# Patient Record
Sex: FEMALE | Race: WHITE | Employment: OTHER | URBAN - METROPOLITAN AREA
[De-identification: names, ages, dates, MRNs, and addresses within clinical notes are randomized per-mention and may not be internally consistent; named-entity substitution may affect disease eponyms.]

---

## 2017-01-01 ENCOUNTER — APPOINTMENT (OUTPATIENT)
Dept: GENERAL RADIOLOGY | Facility: HOSPITAL | Age: 67
DRG: 291 | End: 2017-01-01
Attending: INTERNAL MEDICINE
Payer: MEDICARE

## 2017-01-01 ENCOUNTER — APPOINTMENT (OUTPATIENT)
Dept: ULTRASOUND IMAGING | Facility: HOSPITAL | Age: 67
DRG: 291 | End: 2017-01-01
Attending: HOSPITALIST
Payer: MEDICARE

## 2017-01-01 ENCOUNTER — APPOINTMENT (OUTPATIENT)
Dept: ULTRASOUND IMAGING | Facility: HOSPITAL | Age: 67
DRG: 291 | End: 2017-01-01
Attending: INTERNAL MEDICINE
Payer: MEDICARE

## 2017-01-01 ENCOUNTER — APPOINTMENT (OUTPATIENT)
Dept: GENERAL RADIOLOGY | Facility: HOSPITAL | Age: 67
DRG: 291 | End: 2017-01-01
Attending: HOSPITALIST
Payer: MEDICARE

## 2017-01-01 ENCOUNTER — APPOINTMENT (OUTPATIENT)
Dept: CV DIAGNOSTICS | Facility: HOSPITAL | Age: 67
DRG: 291 | End: 2017-01-01
Attending: HOSPITALIST
Payer: MEDICARE

## 2017-01-01 ENCOUNTER — APPOINTMENT (OUTPATIENT)
Dept: GENERAL RADIOLOGY | Facility: HOSPITAL | Age: 67
DRG: 291 | End: 2017-01-01
Attending: NURSE PRACTITIONER
Payer: MEDICARE

## 2017-01-01 ENCOUNTER — APPOINTMENT (OUTPATIENT)
Dept: GENERAL RADIOLOGY | Facility: HOSPITAL | Age: 67
DRG: 291 | End: 2017-01-01
Attending: STUDENT IN AN ORGANIZED HEALTH CARE EDUCATION/TRAINING PROGRAM
Payer: MEDICARE

## 2017-01-01 ENCOUNTER — APPOINTMENT (OUTPATIENT)
Dept: GENERAL RADIOLOGY | Facility: HOSPITAL | Age: 67
DRG: 291 | End: 2017-01-01
Attending: EMERGENCY MEDICINE
Payer: MEDICARE

## 2017-01-01 ENCOUNTER — APPOINTMENT (OUTPATIENT)
Dept: NUCLEAR MEDICINE | Facility: HOSPITAL | Age: 67
DRG: 291 | End: 2017-01-01
Attending: INTERNAL MEDICINE
Payer: MEDICARE

## 2017-01-01 ENCOUNTER — APPOINTMENT (OUTPATIENT)
Dept: CT IMAGING | Facility: HOSPITAL | Age: 67
DRG: 291 | End: 2017-01-01
Attending: INTERNAL MEDICINE
Payer: MEDICARE

## 2017-01-01 ENCOUNTER — APPOINTMENT (OUTPATIENT)
Dept: INTERVENTIONAL RADIOLOGY/VASCULAR | Facility: HOSPITAL | Age: 67
DRG: 291 | End: 2017-01-01
Attending: INTERNAL MEDICINE
Payer: MEDICARE

## 2017-01-01 ENCOUNTER — HOSPITAL ENCOUNTER (INPATIENT)
Facility: HOSPITAL | Age: 67
LOS: 17 days | DRG: 291 | End: 2017-01-01
Attending: EMERGENCY MEDICINE | Admitting: HOSPITALIST
Payer: MEDICARE

## 2017-01-01 DIAGNOSIS — K92.2 GASTROINTESTINAL HEMORRHAGE, UNSPECIFIED GASTROINTESTINAL HEMORRHAGE TYPE: ICD-10-CM

## 2017-01-01 DIAGNOSIS — D64.9 ANEMIA, UNSPECIFIED TYPE: ICD-10-CM

## 2017-01-01 DIAGNOSIS — J44.1 COPD EXACERBATION (HCC): Primary | ICD-10-CM

## 2017-01-01 LAB
AFP TUMOR MARKER: 6.9 NG/ML (ref ?–8)
ALBUMIN SERPL-MCNC: 1.9 G/DL (ref 3.5–4.8)
ALBUMIN SERPL-MCNC: 2.2 G/DL (ref 3.5–4.8)
ALBUMIN SERPL-MCNC: 2.2 G/DL (ref 3.5–4.8)
ALBUMIN SERPL-MCNC: 2.4 G/DL (ref 3.5–4.8)
ALBUMIN SERPL-MCNC: 2.5 G/DL (ref 3.5–4.8)
ALBUMIN SERPL-MCNC: 2.5 G/DL (ref 3.5–4.8)
ALLENS TEST: POSITIVE
ALP LIVER SERPL-CCNC: 115 U/L (ref 55–142)
ALP LIVER SERPL-CCNC: 77 U/L (ref 55–142)
ALP LIVER SERPL-CCNC: 78 U/L (ref 55–142)
ALP LIVER SERPL-CCNC: 78 U/L (ref 55–142)
ALP LIVER SERPL-CCNC: 79 U/L (ref 55–142)
ALP LIVER SERPL-CCNC: 79 U/L (ref 55–142)
ALP LIVER SERPL-CCNC: 84 U/L (ref 55–142)
ALP LIVER SERPL-CCNC: 85 U/L (ref 55–142)
ALP LIVER SERPL-CCNC: 86 U/L (ref 55–142)
ALT SERPL-CCNC: 12 U/L (ref 14–54)
ALT SERPL-CCNC: 13 U/L (ref 14–54)
ALT SERPL-CCNC: 15 U/L (ref 14–54)
ALT SERPL-CCNC: 16 U/L (ref 14–54)
ALT SERPL-CCNC: 26 U/L (ref 14–54)
ALT SERPL-CCNC: 29 U/L (ref 14–54)
ALT SERPL-CCNC: 7 U/L (ref 14–54)
ALT SERPL-CCNC: 8 U/L (ref 14–54)
ALT SERPL-CCNC: 9 U/L (ref 14–54)
AMMONIA: 156 UMOL/L (ref 11–32)
AMMONIA: 167 UMOL/L (ref 11–32)
AMMONIA: 170 UMOL/L (ref 11–32)
AMMONIA: 46 UMOL/L (ref 11–32)
AMMONIA: 47 UMOL/L (ref 11–32)
AMMONIA: 571 UMOL/L (ref 11–32)
AMMONIA: 60 UMOL/L (ref 11–32)
ANTIBODY SCREEN: NEGATIVE
APTT PPP: 100.6 SECONDS (ref 25–34)
APTT PPP: 102.8 SECONDS (ref 25–34)
APTT PPP: 103.9 SECONDS (ref 25–34)
APTT PPP: 118.1 SECONDS (ref 25–34)
APTT PPP: 130.5 SECONDS (ref 25–34)
APTT PPP: 48 SECONDS (ref 25–34)
APTT PPP: 56.6 SECONDS (ref 25–34)
APTT PPP: 59.4 SECONDS (ref 25–34)
APTT PPP: 66 SECONDS (ref 25–34)
APTT PPP: 73.5 SECONDS (ref 25–34)
APTT PPP: 84.7 SECONDS (ref 25–34)
APTT PPP: 84.8 SECONDS (ref 25–34)
APTT PPP: 84.8 SECONDS (ref 25–34)
APTT PPP: 86.3 SECONDS (ref 25–34)
APTT PPP: 87.7 SECONDS (ref 25–34)
APTT PPP: >300 SECONDS (ref 25–34)
ARTERIAL BLD GAS O2 SATURATION: 89 % (ref 92–100)
ARTERIAL BLD GAS O2 SATURATION: 90 % (ref 92–100)
ARTERIAL BLD GAS O2 SATURATION: 91 % (ref 92–100)
ARTERIAL BLD GAS O2 SATURATION: 95 % (ref 92–100)
ARTERIAL BLD GAS O2 SATURATION: 96 % (ref 92–100)
ARTERIAL BLD GAS O2 SATURATION: 97 % (ref 92–100)
ARTERIAL BLD GAS O2 SATURATION: 97 % (ref 92–100)
ARTERIAL BLOOD GAS BASE EXCESS: -0.5
ARTERIAL BLOOD GAS BASE EXCESS: -1.4
ARTERIAL BLOOD GAS BASE EXCESS: -4.4
ARTERIAL BLOOD GAS BASE EXCESS: -6.7
ARTERIAL BLOOD GAS BASE EXCESS: 0.2
ARTERIAL BLOOD GAS BASE EXCESS: 1.4
ARTERIAL BLOOD GAS BASE EXCESS: 1.5
ARTERIAL BLOOD GAS BASE EXCESS: 4.1
ARTERIAL BLOOD GAS BASE EXCESS: 4.1
ARTERIAL BLOOD GAS BASE EXCESS: 4.9
ARTERIAL BLOOD GAS BASE EXCESS: 6.2
ARTERIAL BLOOD GAS BASE EXCESS: 6.6
ARTERIAL BLOOD GAS BASE EXCESS: 7.4
ARTERIAL BLOOD GAS HCO3: 19.8 MEQ/L (ref 22–26)
ARTERIAL BLOOD GAS HCO3: 23.2 MEQ/L (ref 22–26)
ARTERIAL BLOOD GAS HCO3: 26.2 MEQ/L (ref 22–26)
ARTERIAL BLOOD GAS HCO3: 26.7 MEQ/L (ref 22–26)
ARTERIAL BLOOD GAS HCO3: 27.3 MEQ/L (ref 22–26)
ARTERIAL BLOOD GAS HCO3: 29.2 MEQ/L (ref 22–26)
ARTERIAL BLOOD GAS HCO3: 30.5 MEQ/L (ref 22–26)
ARTERIAL BLOOD GAS HCO3: 30.8 MEQ/L (ref 22–26)
ARTERIAL BLOOD GAS HCO3: 31.2 MEQ/L (ref 22–26)
ARTERIAL BLOOD GAS HCO3: 31.3 MEQ/L (ref 22–26)
ARTERIAL BLOOD GAS HCO3: 32.2 MEQ/L (ref 22–26)
ARTERIAL BLOOD GAS HCO3: 33.2 MEQ/L (ref 22–26)
ARTERIAL BLOOD GAS HCO3: 33.2 MEQ/L (ref 22–26)
ARTERIAL BLOOD GAS PCO2: 41 MM HG (ref 35–45)
ARTERIAL BLOOD GAS PCO2: 45 MM HG (ref 35–45)
ARTERIAL BLOOD GAS PCO2: 52 MM HG (ref 35–45)
ARTERIAL BLOOD GAS PCO2: 53 MM HG (ref 35–45)
ARTERIAL BLOOD GAS PCO2: 54 MM HG (ref 35–45)
ARTERIAL BLOOD GAS PCO2: 55 MM HG (ref 35–45)
ARTERIAL BLOOD GAS PCO2: 59 MM HG (ref 35–45)
ARTERIAL BLOOD GAS PCO2: 59 MM HG (ref 35–45)
ARTERIAL BLOOD GAS PCO2: 60 MM HG (ref 35–45)
ARTERIAL BLOOD GAS PCO2: 61 MM HG (ref 35–45)
ARTERIAL BLOOD GAS PCO2: 63 MM HG (ref 35–45)
ARTERIAL BLOOD GAS PCO2: 74 MM HG (ref 35–45)
ARTERIAL BLOOD GAS PCO2: 82 MM HG (ref 35–45)
ARTERIAL BLOOD GAS PH: 7.19 (ref 7.35–7.45)
ARTERIAL BLOOD GAS PH: 7.21 (ref 7.35–7.45)
ARTERIAL BLOOD GAS PH: 7.25 (ref 7.35–7.45)
ARTERIAL BLOOD GAS PH: 7.26 (ref 7.35–7.45)
ARTERIAL BLOOD GAS PH: 7.27 (ref 7.35–7.45)
ARTERIAL BLOOD GAS PH: 7.29 (ref 7.35–7.45)
ARTERIAL BLOOD GAS PH: 7.34 (ref 7.35–7.45)
ARTERIAL BLOOD GAS PH: 7.34 (ref 7.35–7.45)
ARTERIAL BLOOD GAS PH: 7.36 (ref 7.35–7.45)
ARTERIAL BLOOD GAS PH: 7.38 (ref 7.35–7.45)
ARTERIAL BLOOD GAS PH: 7.39 (ref 7.35–7.45)
ARTERIAL BLOOD GAS PH: 7.39 (ref 7.35–7.45)
ARTERIAL BLOOD GAS PH: 7.41 (ref 7.35–7.45)
ARTERIAL BLOOD GAS PO2: 110 MM HG (ref 80–105)
ARTERIAL BLOOD GAS PO2: 117 MM HG (ref 80–105)
ARTERIAL BLOOD GAS PO2: 121 MM HG (ref 80–105)
ARTERIAL BLOOD GAS PO2: 346 MM HG (ref 80–105)
ARTERIAL BLOOD GAS PO2: 65 MM HG (ref 80–105)
ARTERIAL BLOOD GAS PO2: 66 MM HG (ref 80–105)
ARTERIAL BLOOD GAS PO2: 81 MM HG (ref 80–105)
ARTERIAL BLOOD GAS PO2: 81 MM HG (ref 80–105)
ARTERIAL BLOOD GAS PO2: 85 MM HG (ref 80–105)
ARTERIAL BLOOD GAS PO2: 85 MM HG (ref 80–105)
ARTERIAL BLOOD GAS PO2: 86 MM HG (ref 80–105)
ARTERIAL BLOOD GAS PO2: 96 MM HG (ref 80–105)
ARTERIAL BLOOD GAS PO2: 98 MM HG (ref 80–105)
AST SERPL-CCNC: 39 U/L (ref 15–41)
AST SERPL-CCNC: 41 U/L (ref 15–41)
AST SERPL-CCNC: 44 U/L (ref 15–41)
AST SERPL-CCNC: 47 U/L (ref 15–41)
AST SERPL-CCNC: 47 U/L (ref 15–41)
AST SERPL-CCNC: 49 U/L (ref 15–41)
AST SERPL-CCNC: 50 U/L (ref 15–41)
AST SERPL-CCNC: 51 U/L (ref 15–41)
AST SERPL-CCNC: 52 U/L (ref 15–41)
AST SERPL-CCNC: 53 U/L (ref 15–41)
AST SERPL-CCNC: 61 U/L (ref 15–41)
ATRIAL RATE: 150 BPM
ATRIAL RATE: 70 BPM
BAND %: 1 %
BAND %: 12 %
BAND %: 14 %
BAND %: 15 %
BAND %: 16 %
BAND %: 17 %
BAND %: 24 %
BAND %: 3 %
BAND %: 5 %
BAND %: 6 %
BASOPHIL % MANUAL: 0 %
BASOPHIL % MANUAL: 2 %
BASOPHIL % MANUAL: 2 %
BASOPHIL ABSOLUTE MANUAL: 0 X10(3) UL (ref 0–0.1)
BASOPHIL ABSOLUTE MANUAL: 0.31 X10(3) UL (ref 0–0.1)
BASOPHIL ABSOLUTE MANUAL: 0.38 X10(3) UL (ref 0–0.1)
BASOPHILS # BLD AUTO: 0.04 X10(3) UL (ref 0–0.1)
BASOPHILS # BLD AUTO: 0.05 X10(3) UL (ref 0–0.1)
BASOPHILS # BLD AUTO: 0.06 X10(3) UL (ref 0–0.1)
BASOPHILS # BLD AUTO: 0.07 X10(3) UL (ref 0–0.1)
BASOPHILS # BLD AUTO: 0.09 X10(3) UL (ref 0–0.1)
BASOPHILS # BLD AUTO: 0.1 X10(3) UL (ref 0–0.1)
BASOPHILS # BLD AUTO: 0.11 X10(3) UL (ref 0–0.1)
BASOPHILS NFR BLD AUTO: 0.3 %
BASOPHILS NFR BLD AUTO: 0.4 %
BASOPHILS NFR BLD AUTO: 0.4 %
BASOPHILS NFR BLD AUTO: 0.6 %
BILIRUB DIRECT SERPL-MCNC: 0.2 MG/DL (ref 0.1–0.5)
BILIRUB SERPL-MCNC: 0.4 MG/DL (ref 0.1–2)
BILIRUB SERPL-MCNC: 0.4 MG/DL (ref 0.1–2)
BILIRUB SERPL-MCNC: 0.5 MG/DL (ref 0.1–2)
BILIRUB SERPL-MCNC: 0.6 MG/DL (ref 0.1–2)
BILIRUB SERPL-MCNC: 0.6 MG/DL (ref 0.1–2)
BILIRUB SERPL-MCNC: 0.7 MG/DL (ref 0.1–2)
BILIRUB SERPL-MCNC: 0.7 MG/DL (ref 0.1–2)
BILIRUB SERPL-MCNC: 1.1 MG/DL (ref 0.1–2)
BILIRUB UR QL STRIP.AUTO: NEGATIVE
BLOOD TYPE BARCODE: 600
BLOOD TYPE BARCODE: 6200
BLOOD TYPE BARCODE: 6200
BUN BLD-MCNC: 10 MG/DL (ref 8–20)
BUN BLD-MCNC: 11 MG/DL (ref 8–20)
BUN BLD-MCNC: 12 MG/DL (ref 8–20)
BUN BLD-MCNC: 13 MG/DL (ref 8–20)
BUN BLD-MCNC: 13 MG/DL (ref 8–20)
BUN BLD-MCNC: 14 MG/DL (ref 8–20)
BUN BLD-MCNC: 14 MG/DL (ref 8–20)
BUN BLD-MCNC: 15 MG/DL (ref 8–20)
BUN BLD-MCNC: 15 MG/DL (ref 8–20)
BUN BLD-MCNC: 16 MG/DL (ref 8–20)
BUN BLD-MCNC: 17 MG/DL (ref 8–20)
BUN BLD-MCNC: 17 MG/DL (ref 8–20)
BUN BLD-MCNC: 18 MG/DL (ref 8–20)
BUN BLD-MCNC: 19 MG/DL (ref 8–20)
BUN BLD-MCNC: 19 MG/DL (ref 8–20)
BUN BLD-MCNC: 21 MG/DL (ref 8–20)
BUN BLD-MCNC: 22 MG/DL (ref 8–20)
BUN BLD-MCNC: 25 MG/DL (ref 8–20)
BUN BLD-MCNC: 25 MG/DL (ref 8–20)
BUN BLD-MCNC: 28 MG/DL (ref 8–20)
BUN BLD-MCNC: 30 MG/DL (ref 8–20)
BUN BLD-MCNC: 32 MG/DL (ref 8–20)
BUN BLD-MCNC: 33 MG/DL (ref 8–20)
BUN BLD-MCNC: 34 MG/DL (ref 8–20)
BUN BLD-MCNC: 35 MG/DL (ref 8–20)
BUN BLD-MCNC: 37 MG/DL (ref 8–20)
BUN BLD-MCNC: 41 MG/DL (ref 8–20)
BUN BLD-MCNC: 8 MG/DL (ref 8–20)
C-REACTIVE PROTEIN: 1.77 MG/DL (ref ?–1)
CALCIUM BLD-MCNC: 7.4 MG/DL (ref 8.3–10.3)
CALCIUM BLD-MCNC: 8.2 MG/DL (ref 8.3–10.3)
CALCIUM BLD-MCNC: 8.3 MG/DL (ref 8.3–10.3)
CALCIUM BLD-MCNC: 8.6 MG/DL (ref 8.3–10.3)
CALCIUM BLD-MCNC: 8.7 MG/DL (ref 8.3–10.3)
CALCIUM BLD-MCNC: 8.8 MG/DL (ref 8.3–10.3)
CALCIUM BLD-MCNC: 8.8 MG/DL (ref 8.3–10.3)
CALCIUM BLD-MCNC: 8.9 MG/DL (ref 8.3–10.3)
CALCIUM BLD-MCNC: 9 MG/DL (ref 8.3–10.3)
CALCIUM BLD-MCNC: 9.1 MG/DL (ref 8.3–10.3)
CALCIUM BLD-MCNC: 9.2 MG/DL (ref 8.3–10.3)
CALCIUM BLD-MCNC: 9.3 MG/DL (ref 8.3–10.3)
CALCIUM BLD-MCNC: 9.4 MG/DL (ref 8.3–10.3)
CALCIUM BLD-MCNC: 9.6 MG/DL (ref 8.3–10.3)
CALCIUM BLD-MCNC: 9.7 MG/DL (ref 8.3–10.3)
CALCIUM BLD-MCNC: 9.8 MG/DL (ref 8.3–10.3)
CALCULATED O2 SATURATION: 100 % (ref 92–100)
CALCULATED O2 SATURATION: 89 % (ref 92–100)
CALCULATED O2 SATURATION: 90 % (ref 92–100)
CALCULATED O2 SATURATION: 92 % (ref 92–100)
CALCULATED O2 SATURATION: 95 % (ref 92–100)
CALCULATED O2 SATURATION: 96 % (ref 92–100)
CALCULATED O2 SATURATION: 96 % (ref 92–100)
CALCULATED O2 SATURATION: 97 % (ref 92–100)
CALCULATED O2 SATURATION: 98 % (ref 92–100)
CALCULATED O2 SATURATION: 99 % (ref 92–100)
CALCULATED O2 SATURATION: 99 % (ref 92–100)
CARBOXYHEMOGLOBIN: 1.3 % SAT (ref 0–3)
CARBOXYHEMOGLOBIN: 1.4 % SAT (ref 0–3)
CARBOXYHEMOGLOBIN: 1.5 % SAT (ref 0–3)
CARBOXYHEMOGLOBIN: 1.6 % SAT (ref 0–3)
CARBOXYHEMOGLOBIN: 1.7 % SAT (ref 0–3)
CHLORIDE: 100 MMOL/L (ref 101–111)
CHLORIDE: 101 MMOL/L (ref 101–111)
CHLORIDE: 102 MMOL/L (ref 101–111)
CHLORIDE: 93 MMOL/L (ref 101–111)
CHLORIDE: 93 MMOL/L (ref 101–111)
CHLORIDE: 94 MMOL/L (ref 101–111)
CHLORIDE: 96 MMOL/L (ref 101–111)
CHLORIDE: 97 MMOL/L (ref 101–111)
CHLORIDE: 98 MMOL/L (ref 101–111)
CHLORIDE: 99 MMOL/L (ref 101–111)
CLARITY UR REFRACT.AUTO: CLEAR
CO2: 22 MMOL/L (ref 22–32)
CO2: 24 MMOL/L (ref 22–32)
CO2: 27 MMOL/L (ref 22–32)
CO2: 29 MMOL/L (ref 22–32)
CO2: 30 MMOL/L (ref 22–32)
CO2: 31 MMOL/L (ref 22–32)
CO2: 32 MMOL/L (ref 22–32)
CO2: 33 MMOL/L (ref 22–32)
CO2: 34 MMOL/L (ref 22–32)
CO2: 34 MMOL/L (ref 22–32)
CO2: 35 MMOL/L (ref 22–32)
CO2: 35 MMOL/L (ref 22–32)
CO2: 36 MMOL/L (ref 22–32)
CO2: 36 MMOL/L (ref 22–32)
COLOR UR AUTO: YELLOW
COLOR UR AUTO: YELLOW
CORTISOL: 19.2 UG/DL
CREAT BLD-MCNC: 0.68 MG/DL (ref 0.55–1.02)
CREAT BLD-MCNC: 1.01 MG/DL (ref 0.55–1.02)
CREAT BLD-MCNC: 1.15 MG/DL (ref 0.55–1.02)
CREAT BLD-MCNC: 1.19 MG/DL (ref 0.55–1.02)
CREAT BLD-MCNC: 1.21 MG/DL (ref 0.55–1.02)
CREAT BLD-MCNC: 1.22 MG/DL (ref 0.55–1.02)
CREAT BLD-MCNC: 1.22 MG/DL (ref 0.55–1.02)
CREAT BLD-MCNC: 1.23 MG/DL (ref 0.55–1.02)
CREAT BLD-MCNC: 1.25 MG/DL (ref 0.55–1.02)
CREAT BLD-MCNC: 1.26 MG/DL (ref 0.55–1.02)
CREAT BLD-MCNC: 1.28 MG/DL (ref 0.55–1.02)
CREAT BLD-MCNC: 1.28 MG/DL (ref 0.55–1.02)
CREAT BLD-MCNC: 1.36 MG/DL (ref 0.55–1.02)
CREAT BLD-MCNC: 1.37 MG/DL (ref 0.55–1.02)
CREAT BLD-MCNC: 1.37 MG/DL (ref 0.55–1.02)
CREAT BLD-MCNC: 1.38 MG/DL (ref 0.55–1.02)
CREAT BLD-MCNC: 1.39 MG/DL (ref 0.55–1.02)
CREAT BLD-MCNC: 1.45 MG/DL (ref 0.55–1.02)
CREAT BLD-MCNC: 1.45 MG/DL (ref 0.55–1.02)
CREAT BLD-MCNC: 1.47 MG/DL (ref 0.55–1.02)
CREAT BLD-MCNC: 1.48 MG/DL (ref 0.55–1.02)
CREAT BLD-MCNC: 1.5 MG/DL (ref 0.55–1.02)
CREAT BLD-MCNC: 1.5 MG/DL (ref 0.55–1.02)
CREAT BLD-MCNC: 1.52 MG/DL (ref 0.55–1.02)
CREAT BLD-MCNC: 1.7 MG/DL (ref 0.55–1.02)
CREAT BLD-MCNC: 1.83 MG/DL (ref 0.55–1.02)
CREAT BLD-MCNC: 1.89 MG/DL (ref 0.55–1.02)
CREAT BLD-MCNC: 1.93 MG/DL (ref 0.55–1.02)
CREAT BLD-MCNC: 2 MG/DL (ref 0.55–1.02)
CREAT BLD-MCNC: 2.1 MG/DL (ref 0.55–1.02)
CREAT BLD-MCNC: 2.12 MG/DL (ref 0.55–1.02)
CREAT BLD-MCNC: 2.12 MG/DL (ref 0.55–1.02)
CREAT BLD-MCNC: 2.91 MG/DL (ref 0.55–1.02)
CREAT BLD-MCNC: 2.94 MG/DL (ref 0.55–1.02)
CREAT BLD-MCNC: 2.95 MG/DL (ref 0.55–1.02)
CREAT BLD-MCNC: 3.01 MG/DL (ref 0.55–1.02)
CREAT BLD-MCNC: 3.08 MG/DL (ref 0.55–1.02)
CREAT BLD-MCNC: 3.43 MG/DL (ref 0.55–1.02)
CREAT UR-SCNC: 189 MG/DL
D-DIMER: 1.74 UG/ML FEU (ref 0–0.49)
DEPRECATED HBV CORE AB SER IA-ACNC: 18.9 NG/ML (ref 10–291)
EOSINOPHIL # BLD AUTO: 0.42 X10(3) UL (ref 0–0.3)
EOSINOPHIL # BLD AUTO: 0.64 X10(3) UL (ref 0–0.3)
EOSINOPHIL # BLD AUTO: 0.7 X10(3) UL (ref 0–0.3)
EOSINOPHIL # BLD AUTO: 0.78 X10(3) UL (ref 0–0.3)
EOSINOPHIL # BLD AUTO: 0.93 X10(3) UL (ref 0–0.3)
EOSINOPHIL # BLD AUTO: 0.94 X10(3) UL (ref 0–0.3)
EOSINOPHIL # BLD AUTO: 1.03 X10(3) UL (ref 0–0.3)
EOSINOPHIL % MANUAL: 0 %
EOSINOPHIL % MANUAL: 1 %
EOSINOPHIL % MANUAL: 2 %
EOSINOPHIL % MANUAL: 4 %
EOSINOPHIL % MANUAL: 4 %
EOSINOPHIL % MANUAL: 5 %
EOSINOPHIL % MANUAL: 5 %
EOSINOPHIL % MANUAL: 9 %
EOSINOPHIL ABSOLUTE MANUAL: 0 X10(3) UL (ref 0–0.3)
EOSINOPHIL ABSOLUTE MANUAL: 0.33 X10(3) UL (ref 0–0.3)
EOSINOPHIL ABSOLUTE MANUAL: 0.39 X10(3) UL (ref 0–0.3)
EOSINOPHIL ABSOLUTE MANUAL: 0.63 X10(3) UL (ref 0–0.3)
EOSINOPHIL ABSOLUTE MANUAL: 0.68 X10(3) UL (ref 0–0.3)
EOSINOPHIL ABSOLUTE MANUAL: 0.9 X10(3) UL (ref 0–0.3)
EOSINOPHIL ABSOLUTE MANUAL: 0.95 X10(3) UL (ref 0–0.3)
EOSINOPHIL ABSOLUTE MANUAL: 1.72 X10(3) UL (ref 0–0.3)
EOSINOPHIL NFR BLD AUTO: 2.5 %
EOSINOPHIL NFR BLD AUTO: 5.1 %
EOSINOPHIL NFR BLD AUTO: 5.3 %
EOSINOPHIL NFR BLD AUTO: 5.4 %
EOSINOPHIL NFR BLD AUTO: 5.5 %
EOSINOPHIL NFR BLD AUTO: 5.5 %
EOSINOPHIL NFR BLD AUTO: 5.7 %
EOSINOPHILS,URINE: POSITIVE
ERYTHROCYTE [DISTWIDTH] IN BLOOD BY AUTOMATED COUNT: 15.9 % (ref 11.5–16)
ERYTHROCYTE [DISTWIDTH] IN BLOOD BY AUTOMATED COUNT: 15.9 % (ref 11.5–16)
ERYTHROCYTE [DISTWIDTH] IN BLOOD BY AUTOMATED COUNT: 16 % (ref 11.5–16)
ERYTHROCYTE [DISTWIDTH] IN BLOOD BY AUTOMATED COUNT: 16.1 % (ref 11.5–16)
ERYTHROCYTE [DISTWIDTH] IN BLOOD BY AUTOMATED COUNT: 16.2 % (ref 11.5–16)
ERYTHROCYTE [DISTWIDTH] IN BLOOD BY AUTOMATED COUNT: 16.2 % (ref 11.5–16)
ERYTHROCYTE [DISTWIDTH] IN BLOOD BY AUTOMATED COUNT: 16.3 % (ref 11.5–16)
ERYTHROCYTE [DISTWIDTH] IN BLOOD BY AUTOMATED COUNT: 16.6 % (ref 11.5–16)
ERYTHROCYTE [DISTWIDTH] IN BLOOD BY AUTOMATED COUNT: 16.9 % (ref 11.5–16)
ERYTHROCYTE [DISTWIDTH] IN BLOOD BY AUTOMATED COUNT: 17.2 % (ref 11.5–16)
ERYTHROCYTE [DISTWIDTH] IN BLOOD BY AUTOMATED COUNT: 17.2 % (ref 11.5–16)
ERYTHROCYTE [DISTWIDTH] IN BLOOD BY AUTOMATED COUNT: 17.6 % (ref 11.5–16)
ERYTHROCYTE [DISTWIDTH] IN BLOOD BY AUTOMATED COUNT: 17.8 % (ref 11.5–16)
ERYTHROCYTE [DISTWIDTH] IN BLOOD BY AUTOMATED COUNT: 18.8 % (ref 11.5–16)
ERYTHROCYTE [DISTWIDTH] IN BLOOD BY AUTOMATED COUNT: 18.9 % (ref 11.5–16)
ERYTHROCYTE [DISTWIDTH] IN BLOOD BY AUTOMATED COUNT: 19 % (ref 11.5–16)
ERYTHROCYTE [DISTWIDTH] IN BLOOD BY AUTOMATED COUNT: 19 % (ref 11.5–16)
ERYTHROCYTE [DISTWIDTH] IN BLOOD BY AUTOMATED COUNT: 19.4 % (ref 11.5–16)
FIO2: 100 %
FIO2: 40 %
FOLATE (FOLIC ACID), SERUM: 31.6 NG/ML (ref 8.7–24)
FREE T4: 1.4 NG/DL (ref 0.9–1.8)
FREE T4: 1.4 NG/DL (ref 0.9–1.8)
FREE T4: 1.5 NG/DL (ref 0.9–1.8)
GLUCOSE BLD-MCNC: 100 MG/DL (ref 70–99)
GLUCOSE BLD-MCNC: 101 MG/DL (ref 70–99)
GLUCOSE BLD-MCNC: 101 MG/DL (ref 70–99)
GLUCOSE BLD-MCNC: 102 MG/DL (ref 70–99)
GLUCOSE BLD-MCNC: 103 MG/DL (ref 70–99)
GLUCOSE BLD-MCNC: 104 MG/DL (ref 70–99)
GLUCOSE BLD-MCNC: 106 MG/DL (ref 70–99)
GLUCOSE BLD-MCNC: 107 MG/DL (ref 70–99)
GLUCOSE BLD-MCNC: 110 MG/DL (ref 70–99)
GLUCOSE BLD-MCNC: 110 MG/DL (ref 70–99)
GLUCOSE BLD-MCNC: 112 MG/DL (ref 70–99)
GLUCOSE BLD-MCNC: 112 MG/DL (ref 70–99)
GLUCOSE BLD-MCNC: 120 MG/DL (ref 70–99)
GLUCOSE BLD-MCNC: 120 MG/DL (ref 70–99)
GLUCOSE BLD-MCNC: 121 MG/DL (ref 70–99)
GLUCOSE BLD-MCNC: 122 MG/DL (ref 70–99)
GLUCOSE BLD-MCNC: 77 MG/DL (ref 70–99)
GLUCOSE BLD-MCNC: 78 MG/DL (ref 70–99)
GLUCOSE BLD-MCNC: 79 MG/DL (ref 70–99)
GLUCOSE BLD-MCNC: 80 MG/DL (ref 70–99)
GLUCOSE BLD-MCNC: 81 MG/DL (ref 70–99)
GLUCOSE BLD-MCNC: 82 MG/DL (ref 70–99)
GLUCOSE BLD-MCNC: 84 MG/DL (ref 70–99)
GLUCOSE BLD-MCNC: 85 MG/DL (ref 70–99)
GLUCOSE BLD-MCNC: 85 MG/DL (ref 70–99)
GLUCOSE BLD-MCNC: 87 MG/DL (ref 70–99)
GLUCOSE BLD-MCNC: 87 MG/DL (ref 70–99)
GLUCOSE BLD-MCNC: 92 MG/DL (ref 70–99)
GLUCOSE BLD-MCNC: 94 MG/DL (ref 70–99)
GLUCOSE BLD-MCNC: 95 MG/DL (ref 70–99)
GLUCOSE BLD-MCNC: 97 MG/DL (ref 70–99)
GLUCOSE BLD-MCNC: 98 MG/DL (ref 70–99)
GLUCOSE BLD-MCNC: 98 MG/DL (ref 70–99)
GLUCOSE UR STRIP.AUTO-MCNC: NEGATIVE MG/DL
HAV AB SERPL IA-ACNC: 1202 PG/ML (ref 193–986)
HAV IGM SER QL: 1.2 MG/DL (ref 1.7–3)
HAV IGM SER QL: 1.3 MG/DL (ref 1.7–3)
HAV IGM SER QL: 1.3 MG/DL (ref 1.7–3)
HAV IGM SER QL: 1.5 MG/DL (ref 1.7–3)
HAV IGM SER QL: 1.6 MG/DL (ref 1.7–3)
HAV IGM SER QL: 1.7 MG/DL (ref 1.7–3)
HAV IGM SER QL: 1.9 MG/DL (ref 1.7–3)
HAV IGM SER QL: 1.9 MG/DL (ref 1.7–3)
HAV IGM SER QL: 2.1 MG/DL (ref 1.7–3)
HAV IGM SER QL: 2.2 MG/DL (ref 1.7–3)
HAV IGM SER QL: 2.2 MG/DL (ref 1.7–3)
HAV IGM SER QL: 2.3 MG/DL (ref 1.7–3)
HAV IGM SER QL: 2.4 MG/DL (ref 1.7–3)
HAV IGM SER QL: 2.5 MG/DL (ref 1.7–3)
HAV IGM SER QL: 2.5 MG/DL (ref 1.7–3)
HCT VFR BLD AUTO: 26.7 % (ref 34–50)
HCT VFR BLD AUTO: 27.4 % (ref 34–50)
HCT VFR BLD AUTO: 27.7 % (ref 34–50)
HCT VFR BLD AUTO: 27.7 % (ref 34–50)
HCT VFR BLD AUTO: 27.8 % (ref 34–50)
HCT VFR BLD AUTO: 28.2 % (ref 34–50)
HCT VFR BLD AUTO: 28.3 % (ref 34–50)
HCT VFR BLD AUTO: 29.1 % (ref 34–50)
HCT VFR BLD AUTO: 29.1 % (ref 34–50)
HCT VFR BLD AUTO: 29.5 % (ref 34–50)
HCT VFR BLD AUTO: 29.7 % (ref 34–50)
HCT VFR BLD AUTO: 30.1 % (ref 34–50)
HCT VFR BLD AUTO: 30.6 % (ref 34–50)
HCT VFR BLD AUTO: 30.7 % (ref 34–50)
HCT VFR BLD AUTO: 32 % (ref 34–50)
HCT VFR BLD AUTO: 33 % (ref 34–50)
HGB BLD-MCNC: 7.7 G/DL (ref 12–16)
HGB BLD-MCNC: 8.1 G/DL (ref 12–16)
HGB BLD-MCNC: 8.2 G/DL (ref 12–16)
HGB BLD-MCNC: 8.4 G/DL (ref 12–16)
HGB BLD-MCNC: 8.4 G/DL (ref 12–16)
HGB BLD-MCNC: 8.5 G/DL (ref 12–16)
HGB BLD-MCNC: 8.5 G/DL (ref 12–16)
HGB BLD-MCNC: 8.6 G/DL (ref 12–16)
HGB BLD-MCNC: 8.7 G/DL (ref 12–16)
HGB BLD-MCNC: 8.9 G/DL (ref 12–16)
HGB BLD-MCNC: 8.9 G/DL (ref 12–16)
HGB BLD-MCNC: 9.1 G/DL (ref 12–16)
HGB BLD-MCNC: 9.1 G/DL (ref 12–16)
HGB BLD-MCNC: 9.2 G/DL (ref 12–16)
HGB BLD-MCNC: 9.3 G/DL (ref 12–16)
HGB BLD-MCNC: 9.3 G/DL (ref 12–16)
HGB BLD-MCNC: 9.5 G/DL (ref 12–16)
HGB BLD-MCNC: 9.5 G/DL (ref 12–16)
HGB BLD-MCNC: 9.7 G/DL (ref 12–16)
HYALINE CASTS #/AREA URNS AUTO: PRESENT /LPF
IMMATURE GRANULOCYTE COUNT: 0.13 X10(3) UL (ref 0–1)
IMMATURE GRANULOCYTE COUNT: 0.15 X10(3) UL (ref 0–1)
IMMATURE GRANULOCYTE COUNT: 0.17 X10(3) UL (ref 0–1)
IMMATURE GRANULOCYTE COUNT: 0.21 X10(3) UL (ref 0–1)
IMMATURE GRANULOCYTE COUNT: 0.24 X10(3) UL (ref 0–1)
IMMATURE GRANULOCYTE COUNT: 0.37 X10(3) UL (ref 0–1)
IMMATURE GRANULOCYTE COUNT: 0.42 X10(3) UL (ref 0–1)
IMMATURE GRANULOCYTE RATIO %: 1 %
IMMATURE GRANULOCYTE RATIO %: 1.1 %
IMMATURE GRANULOCYTE RATIO %: 1.1 %
IMMATURE GRANULOCYTE RATIO %: 1.5 %
IMMATURE GRANULOCYTE RATIO %: 1.5 %
IMMATURE GRANULOCYTE RATIO %: 2.1 %
IMMATURE GRANULOCYTE RATIO %: 2.2 %
INR BLD: 1.38 (ref 0.89–1.11)
INR BLD: 1.42 (ref 0.89–1.11)
INR BLD: 1.63 (ref 0.89–1.11)
IONIZED CALCIUM: 1.17 MMOL/L (ref 1.12–1.32)
IONIZED CALCIUM: 1.18 MMOL/L (ref 1.12–1.32)
IONIZED CALCIUM: 1.22 MMOL/L (ref 1.12–1.32)
IRON SATURATION: 5 % (ref 13–45)
IRON: 21 UG/DL (ref 28–170)
KETONES UR STRIP.AUTO-MCNC: NEGATIVE MG/DL
L/M: 4 L/MIN
L/M: 4 L/MIN
L/M: 5 L/MIN
L/M: 6 L/MIN
L/M: 6 L/MIN
L/M: 7 L/MIN
LACTIC ACID ARTERIAL: 1.4 MMOL/L (ref 0.5–2)
LACTIC ACID ARTERIAL: 1.5 MMOL/L (ref 0.5–2)
LACTIC ACID ARTERIAL: 1.5 MMOL/L (ref 0.5–2)
LACTIC ACID: 1.3 MMOL/L (ref 0.5–2)
LDH: 212 U/L (ref 84–246)
LYMPHOCYTE % MANUAL: 13 %
LYMPHOCYTE % MANUAL: 14 %
LYMPHOCYTE % MANUAL: 14 %
LYMPHOCYTE % MANUAL: 17 %
LYMPHOCYTE % MANUAL: 20 %
LYMPHOCYTE % MANUAL: 3 %
LYMPHOCYTE % MANUAL: 4 %
LYMPHOCYTE % MANUAL: 5 %
LYMPHOCYTE % MANUAL: 6 %
LYMPHOCYTE % MANUAL: 8 %
LYMPHOCYTE ABSOLUTE MANUAL: 1.32 X10(3) UL (ref 0.9–4)
LYMPHOCYTE ABSOLUTE MANUAL: 1.34 X10(3) UL (ref 0.9–4)
LYMPHOCYTE ABSOLUTE MANUAL: 1.44 X10(3) UL (ref 0.9–4)
LYMPHOCYTE ABSOLUTE MANUAL: 1.69 X10(3) UL (ref 0.9–4)
LYMPHOCYTE ABSOLUTE MANUAL: 2 X10(3) UL (ref 0.9–4)
LYMPHOCYTE ABSOLUTE MANUAL: 2.04 X10(3) UL (ref 0.9–4)
LYMPHOCYTE ABSOLUTE MANUAL: 2.66 X10(3) UL (ref 0.9–4)
LYMPHOCYTE ABSOLUTE MANUAL: 2.67 X10(3) UL (ref 0.9–4)
LYMPHOCYTE ABSOLUTE MANUAL: 3.3 X10(3) UL (ref 0.9–4)
LYMPHOCYTE ABSOLUTE MANUAL: 3.42 X10(3) UL (ref 0.9–4)
LYMPHOCYTES # BLD AUTO: 1.11 X10(3) UL (ref 0.9–4)
LYMPHOCYTES # BLD AUTO: 1.34 X10(3) UL (ref 0.9–4)
LYMPHOCYTES # BLD AUTO: 1.36 X10(3) UL (ref 0.9–4)
LYMPHOCYTES # BLD AUTO: 1.45 X10(3) UL (ref 0.9–4)
LYMPHOCYTES # BLD AUTO: 1.58 X10(3) UL (ref 0.9–4)
LYMPHOCYTES # BLD AUTO: 2.13 X10(3) UL (ref 0.9–4)
LYMPHOCYTES # BLD AUTO: 2.14 X10(3) UL (ref 0.9–4)
LYMPHOCYTES NFR BLD AUTO: 11.5 %
LYMPHOCYTES NFR BLD AUTO: 13 %
LYMPHOCYTES NFR BLD AUTO: 17.6 %
LYMPHOCYTES NFR BLD AUTO: 6.8 %
LYMPHOCYTES NFR BLD AUTO: 7.6 %
LYMPHOCYTES NFR BLD AUTO: 7.9 %
LYMPHOCYTES NFR BLD AUTO: 9.5 %
M PROTEIN 24H UR ELPH-MRATE: 120 MG/24 HR (ref ?–100)
M PROTEIN MFR SERPL ELPH: 5.8 G/DL (ref 6.1–8.3)
M PROTEIN MFR SERPL ELPH: 6.3 G/DL (ref 6.1–8.3)
M PROTEIN MFR SERPL ELPH: 6.4 G/DL (ref 6.1–8.3)
M PROTEIN MFR SERPL ELPH: 6.4 G/DL (ref 6.1–8.3)
M PROTEIN MFR SERPL ELPH: 6.5 G/DL (ref 6.1–8.3)
M PROTEIN MFR SERPL ELPH: 6.6 G/DL (ref 6.1–8.3)
M PROTEIN MFR SERPL ELPH: 6.7 G/DL (ref 6.1–8.3)
M PROTEIN MFR SERPL ELPH: 6.9 G/DL (ref 6.1–8.3)
M PROTEIN MFR SERPL ELPH: 6.9 G/DL (ref 6.1–8.3)
M PROTEIN MFR SERPL ELPH: 7 G/DL (ref 6.1–8.3)
M PROTEIN MFR SERPL ELPH: 7 G/DL (ref 6.1–8.3)
MCH RBC QN AUTO: 27.3 PG (ref 27–33.2)
MCH RBC QN AUTO: 27.3 PG (ref 27–33.2)
MCH RBC QN AUTO: 27.5 PG (ref 27–33.2)
MCH RBC QN AUTO: 27.5 PG (ref 27–33.2)
MCH RBC QN AUTO: 27.7 PG (ref 27–33.2)
MCH RBC QN AUTO: 27.7 PG (ref 27–33.2)
MCH RBC QN AUTO: 27.8 PG (ref 27–33.2)
MCH RBC QN AUTO: 28 PG (ref 27–33.2)
MCH RBC QN AUTO: 28 PG (ref 27–33.2)
MCH RBC QN AUTO: 28.1 PG (ref 27–33.2)
MCH RBC QN AUTO: 28.1 PG (ref 27–33.2)
MCH RBC QN AUTO: 28.2 PG (ref 27–33.2)
MCH RBC QN AUTO: 28.2 PG (ref 27–33.2)
MCH RBC QN AUTO: 28.6 PG (ref 27–33.2)
MCH RBC QN AUTO: 28.8 PG (ref 27–33.2)
MCH RBC QN AUTO: 28.9 PG (ref 27–33.2)
MCH RBC QN AUTO: 28.9 PG (ref 27–33.2)
MCH RBC QN AUTO: 29 PG (ref 27–33.2)
MCHC RBC AUTO-ENTMCNC: 28.6 G/DL (ref 31–37)
MCHC RBC AUTO-ENTMCNC: 28.8 G/DL (ref 31–37)
MCHC RBC AUTO-ENTMCNC: 29.4 G/DL (ref 31–37)
MCHC RBC AUTO-ENTMCNC: 29.6 G/DL (ref 31–37)
MCHC RBC AUTO-ENTMCNC: 29.7 G/DL (ref 31–37)
MCHC RBC AUTO-ENTMCNC: 29.7 G/DL (ref 31–37)
MCHC RBC AUTO-ENTMCNC: 29.9 G/DL (ref 31–37)
MCHC RBC AUTO-ENTMCNC: 29.9 G/DL (ref 31–37)
MCHC RBC AUTO-ENTMCNC: 30 G/DL (ref 31–37)
MCHC RBC AUTO-ENTMCNC: 30 G/DL (ref 31–37)
MCHC RBC AUTO-ENTMCNC: 30.1 G/DL (ref 31–37)
MCHC RBC AUTO-ENTMCNC: 30.2 G/DL (ref 31–37)
MCHC RBC AUTO-ENTMCNC: 30.3 G/DL (ref 31–37)
MCHC RBC AUTO-ENTMCNC: 30.4 G/DL (ref 31–37)
MCHC RBC AUTO-ENTMCNC: 30.4 G/DL (ref 31–37)
MCHC RBC AUTO-ENTMCNC: 30.7 G/DL (ref 31–37)
MCV RBC AUTO: 91.6 FL (ref 81–100)
MCV RBC AUTO: 91.9 FL (ref 81–100)
MCV RBC AUTO: 92 FL (ref 81–100)
MCV RBC AUTO: 92.4 FL (ref 81–100)
MCV RBC AUTO: 92.8 FL (ref 81–100)
MCV RBC AUTO: 92.9 FL (ref 81–100)
MCV RBC AUTO: 93 FL (ref 81–100)
MCV RBC AUTO: 93.4 FL (ref 81–100)
MCV RBC AUTO: 94.2 FL (ref 81–100)
MCV RBC AUTO: 94.2 FL (ref 81–100)
MCV RBC AUTO: 94.5 FL (ref 81–100)
MCV RBC AUTO: 95.1 FL (ref 81–100)
MCV RBC AUTO: 95.2 FL (ref 81–100)
MCV RBC AUTO: 95.3 FL (ref 81–100)
MCV RBC AUTO: 95.5 FL (ref 81–100)
MCV RBC AUTO: 95.6 FL (ref 81–100)
MCV RBC AUTO: 95.9 FL (ref 81–100)
MCV RBC AUTO: 96 FL (ref 81–100)
METAMYELOCYTE %: 1 %
METAMYELOCYTE %: 1 %
METAMYELOCYTE %: 3 %
METAMYELOCYTE %: 3 %
METAMYELOCYTE ABSOLUTE MANUAL: 0.17 X10(3) UL (ref ?–0.01)
METAMYELOCYTE ABSOLUTE MANUAL: 0.19 X10(3) UL (ref ?–0.01)
METAMYELOCYTE ABSOLUTE MANUAL: 0.47 X10(3) UL (ref ?–0.01)
METAMYELOCYTE ABSOLUTE MANUAL: 1.01 X10(3) UL (ref ?–0.01)
METHEMOGLOBIN: 0.6 % SAT (ref 0.4–1.5)
METHEMOGLOBIN: 0.7 % SAT (ref 0.4–1.5)
METHEMOGLOBIN: 0.8 % SAT (ref 0.4–1.5)
METHEMOGLOBIN: 0.9 % SAT (ref 0.4–1.5)
MONOCYTE % MANUAL: 10 %
MONOCYTE % MANUAL: 3 %
MONOCYTE % MANUAL: 4 %
MONOCYTE % MANUAL: 4 %
MONOCYTE % MANUAL: 5 %
MONOCYTE % MANUAL: 6 %
MONOCYTE % MANUAL: 7 %
MONOCYTE % MANUAL: 7 %
MONOCYTE ABSOLUTE MANUAL: 0.51 X10(3) UL (ref 0.1–0.6)
MONOCYTE ABSOLUTE MANUAL: 0.63 X10(3) UL (ref 0.1–0.6)
MONOCYTE ABSOLUTE MANUAL: 0.9 X10(3) UL (ref 0.1–0.6)
MONOCYTE ABSOLUTE MANUAL: 0.97 X10(3) UL (ref 0.1–0.6)
MONOCYTE ABSOLUTE MANUAL: 1.33 X10(3) UL (ref 0.1–0.6)
MONOCYTE ABSOLUTE MANUAL: 1.34 X10(3) UL (ref 0.1–0.6)
MONOCYTE ABSOLUTE MANUAL: 1.67 X10(3) UL (ref 0.1–0.6)
MONOCYTE ABSOLUTE MANUAL: 1.91 X10(3) UL (ref 0.1–0.6)
MONOCYTE ABSOLUTE MANUAL: 2.02 X10(3) UL (ref 0.1–0.6)
MONOCYTE ABSOLUTE MANUAL: 3.07 X10(3) UL (ref 0.1–0.6)
MONOCYTES # BLD AUTO: 1.09 X10(3) UL (ref 0.1–0.6)
MONOCYTES # BLD AUTO: 1.23 X10(3) UL (ref 0.1–0.6)
MONOCYTES # BLD AUTO: 1.26 X10(3) UL (ref 0.1–0.6)
MONOCYTES # BLD AUTO: 1.53 X10(3) UL (ref 0.1–0.6)
MONOCYTES # BLD AUTO: 1.59 X10(3) UL (ref 0.1–0.6)
MONOCYTES # BLD AUTO: 1.62 X10(3) UL (ref 0.1–0.6)
MONOCYTES # BLD AUTO: 1.67 X10(3) UL (ref 0.1–0.6)
MONOCYTES NFR BLD AUTO: 10.1 %
MONOCYTES NFR BLD AUTO: 8.3 %
MONOCYTES NFR BLD AUTO: 8.9 %
MONOCYTES NFR BLD AUTO: 9 %
MONOCYTES NFR BLD AUTO: 9 %
MONOCYTES NFR BLD AUTO: 9.4 %
MONOCYTES NFR BLD AUTO: 9.4 %
MORPHOLOGY: NORMAL
MYELOCYTE %: 1 %
MYELOCYTE %: 2 %
MYELOCYTE ABSOLUTE MANUAL: 0.17 X10(3) UL (ref ?–0.01)
MYELOCYTE ABSOLUTE MANUAL: 0.18 X10(3) UL (ref ?–0.01)
MYELOCYTE ABSOLUTE MANUAL: 0.31 X10(3) UL (ref ?–0.01)
MYELOCYTE ABSOLUTE MANUAL: 0.44 X10(3) UL (ref ?–0.01)
NEUTROPHIL ABS PRELIM: 10.01 X10 (3) UL (ref 1.3–6.7)
NEUTROPHIL ABS PRELIM: 10.52 X10 (3) UL (ref 1.3–6.7)
NEUTROPHIL ABS PRELIM: 10.96 X10 (3) UL (ref 1.3–6.7)
NEUTROPHIL ABS PRELIM: 11.17 X10 (3) UL (ref 1.3–6.7)
NEUTROPHIL ABS PRELIM: 12.03 X10 (3) UL (ref 1.3–6.7)
NEUTROPHIL ABS PRELIM: 12.22 X10 (3) UL (ref 1.3–6.7)
NEUTROPHIL ABS PRELIM: 12.26 X10 (3) UL (ref 1.3–6.7)
NEUTROPHIL ABS PRELIM: 12.31 X10 (3) UL (ref 1.3–6.7)
NEUTROPHIL ABS PRELIM: 12.37 X10 (3) UL (ref 1.3–6.7)
NEUTROPHIL ABS PRELIM: 12.72 X10 (3) UL (ref 1.3–6.7)
NEUTROPHIL ABS PRELIM: 12.83 X10 (3) UL (ref 1.3–6.7)
NEUTROPHIL ABS PRELIM: 14.5 X10 (3) UL (ref 1.3–6.7)
NEUTROPHIL ABS PRELIM: 28.68 X10 (3) UL (ref 1.3–6.7)
NEUTROPHIL ABS PRELIM: 29.01 X10 (3) UL (ref 1.3–6.7)
NEUTROPHIL ABS PRELIM: 29.16 X10 (3) UL (ref 1.3–6.7)
NEUTROPHIL ABS PRELIM: 39.5 X10 (3) UL (ref 1.3–6.7)
NEUTROPHIL ABS PRELIM: 8.04 X10 (3) UL (ref 1.3–6.7)
NEUTROPHIL ABSOLUTE MANUAL: 11.3 X10(3) UL (ref 1.3–6.7)
NEUTROPHIL ABSOLUTE MANUAL: 12.14 X10(3) UL (ref 1.3–6.7)
NEUTROPHIL ABSOLUTE MANUAL: 12.22 X10(3) UL (ref 1.3–6.7)
NEUTROPHIL ABSOLUTE MANUAL: 14.06 X10(3) UL (ref 1.3–6.7)
NEUTROPHIL ABSOLUTE MANUAL: 14.58 X10(3) UL (ref 1.3–6.7)
NEUTROPHIL ABSOLUTE MANUAL: 14.74 X10(3) UL (ref 1.3–6.7)
NEUTROPHIL ABSOLUTE MANUAL: 28.98 X10(3) UL (ref 1.3–6.7)
NEUTROPHIL ABSOLUTE MANUAL: 29.3 X10(3) UL (ref 1.3–6.7)
NEUTROPHIL ABSOLUTE MANUAL: 30.82 X10(3) UL (ref 1.3–6.7)
NEUTROPHIL ABSOLUTE MANUAL: 39.07 X10(3) UL (ref 1.3–6.7)
NEUTROPHILS # BLD AUTO: 10.01 X10(3) UL (ref 1.3–6.7)
NEUTROPHILS # BLD AUTO: 10.52 X10(3) UL (ref 1.3–6.7)
NEUTROPHILS # BLD AUTO: 12.03 X10(3) UL (ref 1.3–6.7)
NEUTROPHILS # BLD AUTO: 12.31 X10(3) UL (ref 1.3–6.7)
NEUTROPHILS # BLD AUTO: 12.83 X10(3) UL (ref 1.3–6.7)
NEUTROPHILS # BLD AUTO: 14.5 X10(3) UL (ref 1.3–6.7)
NEUTROPHILS # BLD AUTO: 8.04 X10(3) UL (ref 1.3–6.7)
NEUTROPHILS % MANUAL: 57 %
NEUTROPHILS % MANUAL: 57 %
NEUTROPHILS % MANUAL: 58 %
NEUTROPHILS % MANUAL: 65 %
NEUTROPHILS % MANUAL: 70 %
NEUTROPHILS % MANUAL: 71 %
NEUTROPHILS % MANUAL: 72 %
NEUTROPHILS % MANUAL: 74 %
NEUTROPHILS % MANUAL: 78 %
NEUTROPHILS % MANUAL: 78 %
NEUTROPHILS NFR BLD AUTO: 66.4 %
NEUTROPHILS NFR BLD AUTO: 73 %
NEUTROPHILS NFR BLD AUTO: 73 %
NEUTROPHILS NFR BLD AUTO: 74.2 %
NEUTROPHILS NFR BLD AUTO: 74.5 %
NEUTROPHILS NFR BLD AUTO: 75.9 %
NEUTROPHILS NFR BLD AUTO: 76 %
NITRITE UR QL STRIP.AUTO: NEGATIVE
NRBC CALCULATED: 1
NRBC CALCULATED: 1
P AXIS: 65 DEGREES
P-R INTERVAL: 240 MS
PATIENT TEMPERATURE: 95.7 F
PATIENT TEMPERATURE: 96.2 F
PATIENT TEMPERATURE: 96.3 F
PATIENT TEMPERATURE: 96.5 F
PATIENT TEMPERATURE: 97.3 F
PATIENT TEMPERATURE: 97.6 F
PATIENT TEMPERATURE: 97.7 F
PATIENT TEMPERATURE: 97.8 F
PATIENT TEMPERATURE: 98 F
PATIENT TEMPERATURE: 98.6 F
PATIENT TEMPERATURE: 98.8 F
PEEP: 5 CM H2O
PH UR STRIP.AUTO: 5 [PH] (ref 4.5–8)
PHOSPHATE SERPL-MCNC: 2.2 MG/DL (ref 2.5–4.9)
PHOSPHATE SERPL-MCNC: 2.8 MG/DL (ref 2.5–4.9)
PHOSPHATE SERPL-MCNC: 3 MG/DL (ref 2.5–4.9)
PHOSPHATE SERPL-MCNC: 4 MG/DL (ref 2.5–4.9)
PHOSPHATE SERPL-MCNC: 5.5 MG/DL (ref 2.5–4.9)
PHOSPHATE SERPL-MCNC: 5.8 MG/DL (ref 2.5–4.9)
PHOSPHATE SERPL-MCNC: 6 MG/DL (ref 2.5–4.9)
PHOSPHATE SERPL-MCNC: 6.1 MG/DL (ref 2.5–4.9)
PLATELET # BLD AUTO: 160 10(3)UL (ref 150–450)
PLATELET # BLD AUTO: 166 10(3)UL (ref 150–450)
PLATELET # BLD AUTO: 166 10(3)UL (ref 150–450)
PLATELET # BLD AUTO: 194 10(3)UL (ref 150–450)
PLATELET # BLD AUTO: 201 10(3)UL (ref 150–450)
PLATELET # BLD AUTO: 220 10(3)UL (ref 150–450)
PLATELET # BLD AUTO: 231 10(3)UL (ref 150–450)
PLATELET # BLD AUTO: 240 10(3)UL (ref 150–450)
PLATELET # BLD AUTO: 243 10(3)UL (ref 150–450)
PLATELET # BLD AUTO: 245 10(3)UL (ref 150–450)
PLATELET # BLD AUTO: 246 10(3)UL (ref 150–450)
PLATELET # BLD AUTO: 249 10(3)UL (ref 150–450)
PLATELET # BLD AUTO: 260 10(3)UL (ref 150–450)
PLATELET # BLD AUTO: 284 10(3)UL (ref 150–450)
PLATELET # BLD AUTO: 285 10(3)UL (ref 150–450)
PLATELET # BLD AUTO: 290 10(3)UL (ref 150–450)
PLATELET # BLD AUTO: 294 10(3)UL (ref 150–450)
PLATELET # BLD AUTO: 316 10(3)UL (ref 150–450)
PLATELET MORPHOLOGY: NORMAL
POTASSIUM BLOOD GAS: 4.7 MMOL/L (ref 3.6–5.1)
POTASSIUM BLOOD GAS: 4.9 MMOL/L (ref 3.6–5.1)
POTASSIUM BLOOD GAS: 5.6 MMOL/L (ref 3.6–5.1)
POTASSIUM SERPL-SCNC: 2.6 MMOL/L (ref 3.6–5.1)
POTASSIUM SERPL-SCNC: 3 MMOL/L (ref 3.6–5.1)
POTASSIUM SERPL-SCNC: 3 MMOL/L (ref 3.6–5.1)
POTASSIUM SERPL-SCNC: 3.1 MMOL/L (ref 3.6–5.1)
POTASSIUM SERPL-SCNC: 3.3 MMOL/L (ref 3.6–5.1)
POTASSIUM SERPL-SCNC: 3.5 MMOL/L (ref 3.6–5.1)
POTASSIUM SERPL-SCNC: 3.6 MMOL/L (ref 3.6–5.1)
POTASSIUM SERPL-SCNC: 3.7 MMOL/L (ref 3.6–5.1)
POTASSIUM SERPL-SCNC: 3.8 MMOL/L (ref 3.6–5.1)
POTASSIUM SERPL-SCNC: 3.8 MMOL/L (ref 3.6–5.1)
POTASSIUM SERPL-SCNC: 3.9 MMOL/L (ref 3.6–5.1)
POTASSIUM SERPL-SCNC: 4 MMOL/L (ref 3.6–5.1)
POTASSIUM SERPL-SCNC: 4.1 MMOL/L (ref 3.6–5.1)
POTASSIUM SERPL-SCNC: 4.2 MMOL/L (ref 3.6–5.1)
POTASSIUM SERPL-SCNC: 4.2 MMOL/L (ref 3.6–5.1)
POTASSIUM SERPL-SCNC: 4.3 MMOL/L (ref 3.6–5.1)
POTASSIUM SERPL-SCNC: 4.4 MMOL/L (ref 3.6–5.1)
POTASSIUM SERPL-SCNC: 4.5 MMOL/L (ref 3.6–5.1)
POTASSIUM SERPL-SCNC: 4.5 MMOL/L (ref 3.6–5.1)
POTASSIUM SERPL-SCNC: 4.6 MMOL/L (ref 3.6–5.1)
POTASSIUM SERPL-SCNC: 4.7 MMOL/L (ref 3.6–5.1)
POTASSIUM SERPL-SCNC: 5 MMOL/L (ref 3.6–5.1)
POTASSIUM SERPL-SCNC: 5 MMOL/L (ref 3.6–5.1)
POTASSIUM SERPL-SCNC: 5.8 MMOL/L (ref 3.6–5.1)
POTASSIUM SERPL-SCNC: 5.9 MMOL/L (ref 3.6–5.1)
POTASSIUM SERPL-SCNC: 5.9 MMOL/L (ref 3.6–5.1)
PRO-BETA NATRIURETIC PEPTIDE: 114 PG/ML (ref ?–125)
PRO-BETA NATRIURETIC PEPTIDE: 76 PG/ML (ref ?–125)
PROCALCITONIN SERPL-MCNC: 0.28 NG/ML (ref ?–0.11)
PROT UR STRIP.AUTO-MCNC: 30 MG/DL
PROT UR STRIP.AUTO-MCNC: NEGATIVE MG/DL
PROT UR-MCNC: 44.7 MG/DL
PSA SERPL DL<=0.01 NG/ML-MCNC: 17.1 SECONDS (ref 12–14.3)
PSA SERPL DL<=0.01 NG/ML-MCNC: 17.5 SECONDS (ref 12–14.3)
PSA SERPL DL<=0.01 NG/ML-MCNC: 19.5 SECONDS (ref 12–14.3)
Q-T INTERVAL: 296 MS
Q-T INTERVAL: 420 MS
QRS DURATION: 68 MS
QRS DURATION: 96 MS
QTC CALCULATION (BEZET): 451 MS
QTC CALCULATION (BEZET): 453 MS
R AXIS: -20 DEGREES
R AXIS: -38 DEGREES
RBC # BLD AUTO: 2.78 X10(6)UL (ref 3.8–5.1)
RBC # BLD AUTO: 2.9 X10(6)UL (ref 3.8–5.1)
RBC # BLD AUTO: 2.91 X10(6)UL (ref 3.8–5.1)
RBC # BLD AUTO: 2.95 X10(6)UL (ref 3.8–5.1)
RBC # BLD AUTO: 2.97 X10(6)UL (ref 3.8–5.1)
RBC # BLD AUTO: 3.01 X10(6)UL (ref 3.8–5.1)
RBC # BLD AUTO: 3.06 X10(6)UL (ref 3.8–5.1)
RBC # BLD AUTO: 3.07 X10(6)UL (ref 3.8–5.1)
RBC # BLD AUTO: 3.08 X10(6)UL (ref 3.8–5.1)
RBC # BLD AUTO: 3.09 X10(6)UL (ref 3.8–5.1)
RBC # BLD AUTO: 3.15 X10(6)UL (ref 3.8–5.1)
RBC # BLD AUTO: 3.18 X10(6)UL (ref 3.8–5.1)
RBC # BLD AUTO: 3.25 X10(6)UL (ref 3.8–5.1)
RBC # BLD AUTO: 3.25 X10(6)UL (ref 3.8–5.1)
RBC # BLD AUTO: 3.31 X10(6)UL (ref 3.8–5.1)
RBC # BLD AUTO: 3.35 X10(6)UL (ref 3.8–5.1)
RBC # BLD AUTO: 3.45 X10(6)UL (ref 3.8–5.1)
RBC # BLD AUTO: 3.55 X10(6)UL (ref 3.8–5.1)
RBC #/AREA URNS AUTO: >10 /HPF
RBC #/AREA URNS AUTO: >10 /HPF
RBC UR QL AUTO: NEGATIVE
RED CELL DISTRIBUTION WIDTH-SD: 51.9 FL (ref 35.1–46.3)
RED CELL DISTRIBUTION WIDTH-SD: 52.2 FL (ref 35.1–46.3)
RED CELL DISTRIBUTION WIDTH-SD: 53.3 FL (ref 35.1–46.3)
RED CELL DISTRIBUTION WIDTH-SD: 54.2 FL (ref 35.1–46.3)
RED CELL DISTRIBUTION WIDTH-SD: 54.4 FL (ref 35.1–46.3)
RED CELL DISTRIBUTION WIDTH-SD: 54.8 FL (ref 35.1–46.3)
RED CELL DISTRIBUTION WIDTH-SD: 55.1 FL (ref 35.1–46.3)
RED CELL DISTRIBUTION WIDTH-SD: 55.2 FL (ref 35.1–46.3)
RED CELL DISTRIBUTION WIDTH-SD: 56.4 FL (ref 35.1–46.3)
RED CELL DISTRIBUTION WIDTH-SD: 56.6 FL (ref 35.1–46.3)
RED CELL DISTRIBUTION WIDTH-SD: 57 FL (ref 35.1–46.3)
RED CELL DISTRIBUTION WIDTH-SD: 57.1 FL (ref 35.1–46.3)
RED CELL DISTRIBUTION WIDTH-SD: 60.1 FL (ref 35.1–46.3)
RED CELL DISTRIBUTION WIDTH-SD: 61.1 FL (ref 35.1–46.3)
RED CELL DISTRIBUTION WIDTH-SD: 63.5 FL (ref 35.1–46.3)
RED CELL DISTRIBUTION WIDTH-SD: 63.9 FL (ref 35.1–46.3)
RED CELL DISTRIBUTION WIDTH-SD: 64.4 FL (ref 35.1–46.3)
RED CELL DISTRIBUTION WIDTH-SD: 66 FL (ref 35.1–46.3)
RETIC ABS CALC AUTO: 95.7 X10(3) UL (ref 22.5–147.5)
RETIC IRF CALC: 0.35 RATIO (ref 0.09–0.3)
RETIC%: 3.4 % (ref 0.5–2.5)
RETICULOCYTE HEMOGLOBIN EQUIVALENT: 28.4 PG (ref 28.2–36.3)
RH BLOOD TYPE: POSITIVE
SED RATE-ML: 92 MM/HR (ref 0–25)
SODIUM BLOOD GAS: 128 MMOL/L (ref 136–144)
SODIUM BLOOD GAS: 129 MMOL/L (ref 136–144)
SODIUM BLOOD GAS: 138 MMOL/L (ref 136–144)
SODIUM SERPL-SCNC: 129 MMOL/L (ref 136–144)
SODIUM SERPL-SCNC: 129 MMOL/L (ref 136–144)
SODIUM SERPL-SCNC: 130 MMOL/L (ref 136–144)
SODIUM SERPL-SCNC: 131 MMOL/L (ref 136–144)
SODIUM SERPL-SCNC: 132 MMOL/L (ref 136–144)
SODIUM SERPL-SCNC: 133 MMOL/L (ref 136–144)
SODIUM SERPL-SCNC: 134 MMOL/L (ref 136–144)
SODIUM SERPL-SCNC: 135 MMOL/L (ref 136–144)
SODIUM SERPL-SCNC: 136 MMOL/L (ref 136–144)
SODIUM SERPL-SCNC: 137 MMOL/L (ref 136–144)
SODIUM SERPL-SCNC: 137 MMOL/L (ref 136–144)
SODIUM SERPL-SCNC: 138 MMOL/L (ref 136–144)
SODIUM SERPL-SCNC: 139 MMOL/L (ref 136–144)
SODIUM SERPL-SCNC: 140 MMOL/L (ref 136–144)
SODIUM SERPL-SCNC: 140 MMOL/L (ref 136–144)
SODIUM SERPL-SCNC: 141 MMOL/L (ref 136–144)
SP GR UR STRIP.AUTO: 1.01 (ref 1–1.03)
SP GR UR STRIP.AUTO: 1.01 (ref 1–1.03)
SP GR UR STRIP.AUTO: 1.02 (ref 1–1.03)
SP GR UR STRIP.AUTO: 1.02 (ref 1–1.03)
SPECIMEN VOL UR: 1300 ML
T AXIS: 47 DEGREES
T AXIS: 99 DEGREES
T3FREE SERPL-MCNC: 1.35 PG/ML (ref 2.3–4.2)
TIDAL VOLUME: 330 ML
TOTAL CELLS COUNTED: 100
TOTAL HEMOGLOBIN: 10.6 G/DL (ref 11.7–16)
TOTAL HEMOGLOBIN: 10.6 G/DL (ref 11.7–16)
TOTAL HEMOGLOBIN: 7.5 G/DL (ref 11.7–16)
TOTAL HEMOGLOBIN: 8 G/DL (ref 11.7–16)
TOTAL HEMOGLOBIN: 8.3 G/DL (ref 11.7–16)
TOTAL HEMOGLOBIN: 8.7 G/DL (ref 11.7–16)
TOTAL HEMOGLOBIN: 8.7 G/DL (ref 11.7–16)
TOTAL HEMOGLOBIN: 8.8 G/DL (ref 11.7–16)
TOTAL HEMOGLOBIN: 8.9 G/DL (ref 11.7–16)
TOTAL HEMOGLOBIN: 9.3 G/DL (ref 11.7–16)
TOTAL HEMOGLOBIN: 9.4 G/DL (ref 11.7–16)
TOTAL HEMOGLOBIN: 9.5 G/DL (ref 11.7–16)
TOTAL HEMOGLOBIN: 9.6 G/DL (ref 11.7–16)
TOTAL IRON BINDING CAPACITY: 384 UG/DL (ref 298–536)
TRANSFERRIN: 258 MG/DL (ref 200–360)
TROPONIN: <0.046 NG/ML (ref ?–0.05)
TSI SER-ACNC: 4.3 MIU/ML (ref 0.35–5.5)
TSI SER-ACNC: 6.29 MIU/ML (ref 0.35–5.5)
TSI SER-ACNC: 6.76 MIU/ML (ref 0.35–5.5)
UROBILINOGEN UR STRIP.AUTO-MCNC: <2 MG/DL
VENT RATE: 18 /MIN
VENT RATE: 22 /MIN
VENT RATE: 25 /MIN
VENTRICULAR RATE: 140 BPM
VENTRICULAR RATE: 70 BPM
WBC # BLD AUTO: 12.1 X10(3) UL (ref 4–13)
WBC # BLD AUTO: 13.7 X10(3) UL (ref 4–13)
WBC # BLD AUTO: 14.2 X10(3) UL (ref 4–13)
WBC # BLD AUTO: 15.7 X10(3) UL (ref 4–13)
WBC # BLD AUTO: 16.2 X10(3) UL (ref 4–13)
WBC # BLD AUTO: 16.5 X10(3) UL (ref 4–13)
WBC # BLD AUTO: 17.1 X10(3) UL (ref 4–13)
WBC # BLD AUTO: 17.2 X10(3) UL (ref 4–13)
WBC # BLD AUTO: 18 X10(3) UL (ref 4–13)
WBC # BLD AUTO: 19 X10(3) UL (ref 4–13)
WBC # BLD AUTO: 19.1 X10(3) UL (ref 4–13)
WBC # BLD AUTO: 19.1 X10(3) UL (ref 4–13)
WBC # BLD AUTO: 19.4 X10(3) UL (ref 4–13)
WBC # BLD AUTO: 22.4 X10(3) UL (ref 4–13)
WBC # BLD AUTO: 33.3 X10(3) UL (ref 4–13)
WBC # BLD AUTO: 33.5 X10(3) UL (ref 4–13)
WBC # BLD AUTO: 33.7 X10(3) UL (ref 4–13)
WBC # BLD AUTO: 43.9 X10(3) UL (ref 4–13)
WBC #/AREA URNS AUTO: >50 /HPF
WBC #/AREA URNS AUTO: >50 /HPF
WBC CLUMPS UR QL AUTO: PRESENT
YEAST URINE: PRESENT

## 2017-01-01 PROCEDURE — 71010 XR CHEST AP PORTABLE  (CPT=71010): CPT | Performed by: INTERNAL MEDICINE

## 2017-01-01 PROCEDURE — 99232 SBSQ HOSP IP/OBS MODERATE 35: CPT | Performed by: HOSPITALIST

## 2017-01-01 PROCEDURE — 84100 ASSAY OF PHOSPHORUS: CPT | Performed by: INTERNAL MEDICINE

## 2017-01-01 PROCEDURE — 71020 XR CHEST PA + LAT CHEST (CPT=71020): CPT | Performed by: EMERGENCY MEDICINE

## 2017-01-01 PROCEDURE — 85027 COMPLETE CBC AUTOMATED: CPT | Performed by: HOSPITALIST

## 2017-01-01 PROCEDURE — 85027 COMPLETE CBC AUTOMATED: CPT | Performed by: INTERNAL MEDICINE

## 2017-01-01 PROCEDURE — 82570 ASSAY OF URINE CREATININE: CPT | Performed by: INTERNAL MEDICINE

## 2017-01-01 PROCEDURE — 0BH17EZ INSERTION OF ENDOTRACHEAL AIRWAY INTO TRACHEA, VIA NATURAL OR ARTIFICIAL OPENING: ICD-10-PCS | Performed by: ANESTHESIOLOGY

## 2017-01-01 PROCEDURE — 85025 COMPLETE CBC W/AUTO DIFF WBC: CPT | Performed by: HOSPITALIST

## 2017-01-01 PROCEDURE — 74230 X-RAY XM SWLNG FUNCJ C+: CPT | Performed by: HOSPITALIST

## 2017-01-01 PROCEDURE — 85025 COMPLETE CBC W/AUTO DIFF WBC: CPT | Performed by: INTERNAL MEDICINE

## 2017-01-01 PROCEDURE — 93971 EXTREMITY STUDY: CPT | Performed by: INTERNAL MEDICINE

## 2017-01-01 PROCEDURE — 93306 TTE W/DOPPLER COMPLETE: CPT | Performed by: HOSPITALIST

## 2017-01-01 PROCEDURE — 5A1945Z RESPIRATORY VENTILATION, 24-96 CONSECUTIVE HOURS: ICD-10-PCS | Performed by: INTERNAL MEDICINE

## 2017-01-01 PROCEDURE — 85730 THROMBOPLASTIN TIME PARTIAL: CPT | Performed by: INTERNAL MEDICINE

## 2017-01-01 PROCEDURE — 99233 SBSQ HOSP IP/OBS HIGH 50: CPT | Performed by: INTERNAL MEDICINE

## 2017-01-01 PROCEDURE — 85730 THROMBOPLASTIN TIME PARTIAL: CPT | Performed by: HOSPITALIST

## 2017-01-01 PROCEDURE — 99223 1ST HOSP IP/OBS HIGH 75: CPT | Performed by: INTERNAL MEDICINE

## 2017-01-01 PROCEDURE — 83735 ASSAY OF MAGNESIUM: CPT | Performed by: INTERNAL MEDICINE

## 2017-01-01 PROCEDURE — 76700 US EXAM ABDOM COMPLETE: CPT | Performed by: INTERNAL MEDICINE

## 2017-01-01 PROCEDURE — 99233 SBSQ HOSP IP/OBS HIGH 50: CPT | Performed by: OTHER

## 2017-01-01 PROCEDURE — 85007 BL SMEAR W/DIFF WBC COUNT: CPT | Performed by: INTERNAL MEDICINE

## 2017-01-01 PROCEDURE — 71010 XR CHEST AP PORTABLE  (CPT=71010): CPT | Performed by: STUDENT IN AN ORGANIZED HEALTH CARE EDUCATION/TRAINING PROGRAM

## 2017-01-01 PROCEDURE — 99232 SBSQ HOSP IP/OBS MODERATE 35: CPT | Performed by: INTERNAL MEDICINE

## 2017-01-01 PROCEDURE — 84132 ASSAY OF SERUM POTASSIUM: CPT | Performed by: HOSPITALIST

## 2017-01-01 PROCEDURE — 85610 PROTHROMBIN TIME: CPT | Performed by: INTERNAL MEDICINE

## 2017-01-01 PROCEDURE — 76857 US EXAM PELVIC LIMITED: CPT | Performed by: HOSPITALIST

## 2017-01-01 PROCEDURE — 99223 1ST HOSP IP/OBS HIGH 75: CPT | Performed by: OTHER

## 2017-01-01 PROCEDURE — B518ZZA FLUOROSCOPY OF SUPERIOR VENA CAVA, GUIDANCE: ICD-10-PCS | Performed by: RADIOLOGY

## 2017-01-01 PROCEDURE — 84439 ASSAY OF FREE THYROXINE: CPT | Performed by: INTERNAL MEDICINE

## 2017-01-01 PROCEDURE — 02HV33Z INSERTION OF INFUSION DEVICE INTO SUPERIOR VENA CAVA, PERCUTANEOUS APPROACH: ICD-10-PCS | Performed by: RADIOLOGY

## 2017-01-01 PROCEDURE — 81001 URINALYSIS AUTO W/SCOPE: CPT | Performed by: INTERNAL MEDICINE

## 2017-01-01 PROCEDURE — 99231 SBSQ HOSP IP/OBS SF/LOW 25: CPT | Performed by: HOSPITALIST

## 2017-01-01 PROCEDURE — 71010 XR CHEST AP/PA (1 VIEW) (CPT=71010): CPT | Performed by: INTERNAL MEDICINE

## 2017-01-01 PROCEDURE — 71010 XR CHEST AP PORTABLE  (CPT=71010): CPT | Performed by: HOSPITALIST

## 2017-01-01 PROCEDURE — 80053 COMPREHEN METABOLIC PANEL: CPT | Performed by: INTERNAL MEDICINE

## 2017-01-01 PROCEDURE — 85007 BL SMEAR W/DIFF WBC COUNT: CPT | Performed by: HOSPITALIST

## 2017-01-01 PROCEDURE — 80048 BASIC METABOLIC PNL TOTAL CA: CPT | Performed by: INTERNAL MEDICINE

## 2017-01-01 PROCEDURE — 84443 ASSAY THYROID STIM HORMONE: CPT | Performed by: OTHER

## 2017-01-01 PROCEDURE — 30233N1 TRANSFUSION OF NONAUTOLOGOUS RED BLOOD CELLS INTO PERIPHERAL VEIN, PERCUTANEOUS APPROACH: ICD-10-PCS | Performed by: HOSPITALIST

## 2017-01-01 PROCEDURE — 99291 CRITICAL CARE FIRST HOUR: CPT | Performed by: INTERNAL MEDICINE

## 2017-01-01 PROCEDURE — 99232 SBSQ HOSP IP/OBS MODERATE 35: CPT | Performed by: OTHER

## 2017-01-01 PROCEDURE — 84156 ASSAY OF PROTEIN URINE: CPT | Performed by: INTERNAL MEDICINE

## 2017-01-01 PROCEDURE — 70450 CT HEAD/BRAIN W/O DYE: CPT | Performed by: INTERNAL MEDICINE

## 2017-01-01 PROCEDURE — 87086 URINE CULTURE/COLONY COUNT: CPT | Performed by: INTERNAL MEDICINE

## 2017-01-01 PROCEDURE — 99223 1ST HOSP IP/OBS HIGH 75: CPT | Performed by: HOSPITALIST

## 2017-01-01 PROCEDURE — 95822 EEG COMA OR SLEEP ONLY: CPT | Performed by: OTHER

## 2017-01-01 PROCEDURE — 93970 EXTREMITY STUDY: CPT | Performed by: INTERNAL MEDICINE

## 2017-01-01 PROCEDURE — 87040 BLOOD CULTURE FOR BACTERIA: CPT | Performed by: INTERNAL MEDICINE

## 2017-01-01 PROCEDURE — 90792 PSYCH DIAG EVAL W/MED SRVCS: CPT | Performed by: OTHER

## 2017-01-01 PROCEDURE — 99233 SBSQ HOSP IP/OBS HIGH 50: CPT | Performed by: HOSPITALIST

## 2017-01-01 PROCEDURE — 84439 ASSAY OF FREE THYROXINE: CPT | Performed by: OTHER

## 2017-01-01 PROCEDURE — 74020 XR ABDOMEN, OBSTRUCTIVE SERIES (CPT=74020): CPT | Performed by: NURSE PRACTITIONER

## 2017-01-01 PROCEDURE — 84481 FREE ASSAY (FT-3): CPT | Performed by: INTERNAL MEDICINE

## 2017-01-01 PROCEDURE — 83880 ASSAY OF NATRIURETIC PEPTIDE: CPT | Performed by: INTERNAL MEDICINE

## 2017-01-01 PROCEDURE — 84443 ASSAY THYROID STIM HORMONE: CPT | Performed by: INTERNAL MEDICINE

## 2017-01-01 PROCEDURE — 85032 MANUAL CELL COUNT EACH: CPT | Performed by: INTERNAL MEDICINE

## 2017-01-01 PROCEDURE — 74176 CT ABD & PELVIS W/O CONTRAST: CPT | Performed by: INTERNAL MEDICINE

## 2017-01-01 PROCEDURE — 6A551Z3 PHERESIS OF PLASMA, MULTIPLE: ICD-10-PCS | Performed by: INTERNAL MEDICINE

## 2017-01-01 RX ORDER — DIGOXIN 125 MCG
125 TABLET ORAL DAILY
Status: DISCONTINUED | OUTPATIENT
Start: 2017-01-01 | End: 2017-01-01

## 2017-01-01 RX ORDER — ALBUTEROL SULFATE 90 UG/1
1 AEROSOL, METERED RESPIRATORY (INHALATION) 4 TIMES DAILY
Status: DISCONTINUED | OUTPATIENT
Start: 2017-01-01 | End: 2017-01-01

## 2017-01-01 RX ORDER — IPRATROPIUM BROMIDE AND ALBUTEROL SULFATE 2.5; .5 MG/3ML; MG/3ML
3 SOLUTION RESPIRATORY (INHALATION)
Status: DISCONTINUED | OUTPATIENT
Start: 2017-01-01 | End: 2017-01-01

## 2017-01-01 RX ORDER — MAGNESIUM OXIDE 400 MG (241.3 MG MAGNESIUM) TABLET
400 TABLET ONCE
Status: COMPLETED | OUTPATIENT
Start: 2017-01-01 | End: 2017-01-01

## 2017-01-01 RX ORDER — MAGNESIUM OXIDE 400 MG (241.3 MG MAGNESIUM) TABLET
800 TABLET ONCE
Status: COMPLETED | OUTPATIENT
Start: 2017-01-01 | End: 2017-01-01

## 2017-01-01 RX ORDER — NYSTATIN 100000 U/G
OINTMENT TOPICAL 2 TIMES DAILY
Status: DISCONTINUED | OUTPATIENT
Start: 2017-01-01 | End: 2017-01-01

## 2017-01-01 RX ORDER — POTASSIUM CHLORIDE 20 MEQ/1
40 TABLET, EXTENDED RELEASE ORAL EVERY 4 HOURS
Status: COMPLETED | OUTPATIENT
Start: 2017-01-01 | End: 2017-01-01

## 2017-01-01 RX ORDER — LEVOTHYROXINE SODIUM 0.1 MG/1
100 TABLET ORAL
Status: DISCONTINUED | OUTPATIENT
Start: 2017-01-01 | End: 2017-01-01

## 2017-01-01 RX ORDER — ACETAMINOPHEN 325 MG/1
650 TABLET ORAL EVERY 6 HOURS PRN
Status: DISCONTINUED | OUTPATIENT
Start: 2017-01-01 | End: 2017-07-24

## 2017-01-01 RX ORDER — LACTULOSE 10 G/15ML
20 SOLUTION ORAL 3 TIMES DAILY
Status: DISCONTINUED | OUTPATIENT
Start: 2017-01-01 | End: 2017-01-01

## 2017-01-01 RX ORDER — CHLORHEXIDINE GLUCONATE 0.12 MG/ML
15 RINSE ORAL
Status: DISCONTINUED | OUTPATIENT
Start: 2017-01-01 | End: 2017-01-01

## 2017-01-01 RX ORDER — FLUCONAZOLE 2 MG/ML
100 INJECTION INTRAVENOUS EVERY 24 HOURS
Status: DISCONTINUED | OUTPATIENT
Start: 2017-01-01 | End: 2017-01-01

## 2017-01-01 RX ORDER — HEPARIN SODIUM AND DEXTROSE 10000; 5 [USP'U]/100ML; G/100ML
15 INJECTION INTRAVENOUS ONCE
Status: COMPLETED | OUTPATIENT
Start: 2017-01-01 | End: 2017-01-01

## 2017-01-01 RX ORDER — LORAZEPAM 2 MG/ML
1 INJECTION INTRAMUSCULAR EVERY 4 HOURS PRN
Status: DISCONTINUED | OUTPATIENT
Start: 2017-01-01 | End: 2017-07-24

## 2017-01-01 RX ORDER — CHLOROTHIAZIDE SODIUM 500 MG/1
500 INJECTION INTRAVENOUS DAILY
Status: DISCONTINUED | OUTPATIENT
Start: 2017-01-01 | End: 2017-01-01

## 2017-01-01 RX ORDER — SPIRONOLACTONE 25 MG/1
50 TABLET ORAL
Status: DISCONTINUED | OUTPATIENT
Start: 2017-01-01 | End: 2017-01-01

## 2017-01-01 RX ORDER — CHLOROTHIAZIDE SODIUM 500 MG/1
500 INJECTION INTRAVENOUS 2 TIMES DAILY
Status: DISCONTINUED | OUTPATIENT
Start: 2017-01-01 | End: 2017-01-01

## 2017-01-01 RX ORDER — SODIUM CHLORIDE 9 MG/ML
INJECTION, SOLUTION INTRAVENOUS CONTINUOUS
Status: DISCONTINUED | OUTPATIENT
Start: 2017-07-31 | End: 2017-01-01

## 2017-01-01 RX ORDER — SODIUM CHLORIDE 9 MG/ML
INJECTION, SOLUTION INTRAVENOUS ONCE
Status: COMPLETED | OUTPATIENT
Start: 2017-01-01 | End: 2017-01-01

## 2017-01-01 RX ORDER — ALFUZOSIN HYDROCHLORIDE 10 MG/1
10 TABLET, EXTENDED RELEASE ORAL
Status: DISCONTINUED | OUTPATIENT
Start: 2017-01-01 | End: 2017-01-01

## 2017-01-01 RX ORDER — ALPRAZOLAM 0.25 MG/1
0.25 TABLET ORAL 3 TIMES DAILY PRN
Status: DISCONTINUED | OUTPATIENT
Start: 2017-01-01 | End: 2017-01-01

## 2017-01-01 RX ORDER — FUROSEMIDE 10 MG/ML
40 INJECTION INTRAMUSCULAR; INTRAVENOUS ONCE
Status: COMPLETED | OUTPATIENT
Start: 2017-01-01 | End: 2017-01-01

## 2017-01-01 RX ORDER — PANTOPRAZOLE SODIUM 40 MG/1
40 TABLET, DELAYED RELEASE ORAL
Status: DISCONTINUED | OUTPATIENT
Start: 2017-01-01 | End: 2017-01-01

## 2017-01-01 RX ORDER — ONDANSETRON 2 MG/ML
4 INJECTION INTRAMUSCULAR; INTRAVENOUS EVERY 6 HOURS PRN
Status: DISCONTINUED | OUTPATIENT
Start: 2017-01-01 | End: 2017-07-24

## 2017-01-01 RX ORDER — ARIPIPRAZOLE 15 MG/1
40 TABLET ORAL EVERY 4 HOURS
Status: COMPLETED | OUTPATIENT
Start: 2017-01-01 | End: 2017-01-01

## 2017-01-01 RX ORDER — SERTRALINE HYDROCHLORIDE 25 MG/1
25 TABLET, FILM COATED ORAL DAILY
Status: DISCONTINUED | OUTPATIENT
Start: 2017-01-01 | End: 2017-01-01

## 2017-01-01 RX ORDER — ENOXAPARIN SODIUM 100 MG/ML
40 INJECTION SUBCUTANEOUS DAILY
Status: DISCONTINUED | OUTPATIENT
Start: 2017-01-01 | End: 2017-01-01

## 2017-01-01 RX ORDER — BUMETANIDE 1 MG/1
1 TABLET ORAL 2 TIMES DAILY
COMMUNITY

## 2017-01-01 RX ORDER — CLINDAMYCIN PHOSPHATE 600 MG/50ML
600 INJECTION INTRAVENOUS EVERY 8 HOURS
Status: CANCELLED | OUTPATIENT
Start: 2017-01-01

## 2017-01-01 RX ORDER — LORAZEPAM 2 MG/ML
0.5 INJECTION INTRAMUSCULAR EVERY 4 HOURS PRN
Status: DISCONTINUED | OUTPATIENT
Start: 2017-01-01 | End: 2017-07-24

## 2017-01-01 RX ORDER — LORAZEPAM 2 MG/ML
2 INJECTION INTRAMUSCULAR EVERY 4 HOURS PRN
Status: DISCONTINUED | OUTPATIENT
Start: 2017-01-01 | End: 2017-07-24

## 2017-01-01 RX ORDER — POTASSIUM CHLORIDE 20 MEQ/1
20 TABLET, EXTENDED RELEASE ORAL 2 TIMES DAILY
COMMUNITY

## 2017-01-01 RX ORDER — BUMETANIDE 0.25 MG/ML
2 INJECTION, SOLUTION INTRAMUSCULAR; INTRAVENOUS ONCE
Status: COMPLETED | OUTPATIENT
Start: 2017-01-01 | End: 2017-01-01

## 2017-01-01 RX ORDER — SPIRONOLACTONE 25 MG/1
25 TABLET ORAL DAILY
Status: DISCONTINUED | OUTPATIENT
Start: 2017-01-01 | End: 2017-01-01

## 2017-01-01 RX ORDER — MIDAZOLAM HYDROCHLORIDE 1 MG/ML
2 INJECTION INTRAMUSCULAR; INTRAVENOUS EVERY 5 MIN PRN
Status: DISCONTINUED | OUTPATIENT
Start: 2017-01-01 | End: 2017-07-24

## 2017-01-01 RX ORDER — ALPRAZOLAM 0.25 MG/1
0.25 TABLET ORAL EVERY 8 HOURS PRN
Status: DISCONTINUED | OUTPATIENT
Start: 2017-01-01 | End: 2017-01-01

## 2017-01-01 RX ORDER — LEVOTHYROXINE SODIUM 88 UG/1
88 TABLET ORAL
COMMUNITY

## 2017-01-01 RX ORDER — ALBUMIN, HUMAN INJ 5% 5 %
250 SOLUTION INTRAVENOUS ONCE
Status: COMPLETED | OUTPATIENT
Start: 2017-01-01 | End: 2017-01-01

## 2017-01-01 RX ORDER — HEPARIN SODIUM AND DEXTROSE 10000; 5 [USP'U]/100ML; G/100ML
INJECTION INTRAVENOUS CONTINUOUS
Status: DISCONTINUED | OUTPATIENT
Start: 2017-01-01 | End: 2017-01-01

## 2017-01-01 RX ORDER — LEVOFLOXACIN 5 MG/ML
750 INJECTION, SOLUTION INTRAVENOUS
Status: DISCONTINUED | OUTPATIENT
Start: 2017-01-01 | End: 2017-01-01

## 2017-01-01 RX ORDER — MORPHINE SULFATE 4 MG/ML
2 INJECTION, SOLUTION INTRAMUSCULAR; INTRAVENOUS
Status: DISCONTINUED | OUTPATIENT
Start: 2017-01-01 | End: 2017-07-24

## 2017-01-01 RX ORDER — BISACODYL 10 MG
10 SUPPOSITORY, RECTAL RECTAL
Status: DISCONTINUED | OUTPATIENT
Start: 2017-01-01 | End: 2017-07-24

## 2017-01-01 RX ORDER — FLUTICASONE PROPIONATE AND SALMETEROL 500; 50 UG/1; UG/1
1 POWDER RESPIRATORY (INHALATION) 2 TIMES DAILY
COMMUNITY

## 2017-01-01 RX ORDER — POTASSIUM CHLORIDE 20 MEQ/1
40 TABLET, EXTENDED RELEASE ORAL EVERY 4 HOURS
Status: DISPENSED | OUTPATIENT
Start: 2017-01-01 | End: 2017-01-01

## 2017-01-01 RX ORDER — HEPARIN SODIUM 5000 [USP'U]/ML
70 INJECTION INTRAVENOUS; SUBCUTANEOUS ONCE
Status: COMPLETED | OUTPATIENT
Start: 2017-01-01 | End: 2017-01-01

## 2017-01-01 RX ORDER — SPIRONOLACTONE 25 MG/1
25 TABLET ORAL
Status: DISCONTINUED | OUTPATIENT
Start: 2017-01-01 | End: 2017-01-01

## 2017-01-01 RX ORDER — CHLOROTHIAZIDE SODIUM 500 MG/1
500 INJECTION INTRAVENOUS ONCE
Status: COMPLETED | OUTPATIENT
Start: 2017-01-01 | End: 2017-01-01

## 2017-01-01 RX ORDER — LIDOCAINE HYDROCHLORIDE 10 MG/ML
INJECTION, SOLUTION INFILTRATION; PERINEURAL
Status: COMPLETED
Start: 2017-01-01 | End: 2017-01-01

## 2017-01-01 RX ORDER — POTASSIUM CHLORIDE 20 MEQ/1
40 TABLET, EXTENDED RELEASE ORAL 2 TIMES DAILY
Status: COMPLETED | OUTPATIENT
Start: 2017-01-01 | End: 2017-01-01

## 2017-01-01 RX ORDER — LEVOTHYROXINE SODIUM 88 UG/1
88 TABLET ORAL
Status: DISCONTINUED | OUTPATIENT
Start: 2017-01-01 | End: 2017-01-01

## 2017-01-01 RX ORDER — SODIUM CHLORIDE 9 MG/ML
INJECTION, SOLUTION INTRAVENOUS CONTINUOUS
Status: DISCONTINUED | OUTPATIENT
Start: 2017-01-01 | End: 2017-07-24

## 2017-01-01 RX ORDER — DIGOXIN 0.25 MG/ML
250 INJECTION INTRAMUSCULAR; INTRAVENOUS ONCE
Status: COMPLETED | OUTPATIENT
Start: 2017-01-01 | End: 2017-01-01

## 2017-07-06 PROBLEM — D64.9 ANEMIA, UNSPECIFIED TYPE: Status: ACTIVE | Noted: 2017-01-01

## 2017-07-06 PROBLEM — J44.1 COPD EXACERBATION (HCC): Status: ACTIVE | Noted: 2017-01-01

## 2017-07-06 PROBLEM — K92.2 GASTROINTESTINAL HEMORRHAGE, UNSPECIFIED GASTROINTESTINAL HEMORRHAGE TYPE: Status: ACTIVE | Noted: 2017-01-01

## 2017-07-06 NOTE — CONSULTS
[facility]  Report of Consultation    Brii Preciado Patient Status:  Emergency    1950 MRN [id number]   Location [address] [facility] Attending Dylan Thomas MD   Hosp Day # 0 PCP Maki Hidalgo MD     Reason for Consultation Breast Cancer Mother 79   • Breast Cancer Maternal Aunt 50      reports that she quit smoking about 12 years ago. Her smoking use included Cigarettes. She has a 87.50 pack-year smoking history.  She has never used smokeless tobacco. She reports that she dri tenderness or deformity. Heart: Tachycardic with mild irregularity distant tones difficult to auscultate murmurs   Abdomen: soft, non-tender, non-distended, no masses, no guarding, no     rebound.   Obese no obvious masses   Extremity: No evidence of club steroids  Will institute therapy for possible lower extremity cellulitis  Check lower extremity venous Doppler  Ongoing diuresis- begin diuretic drip  May need scopes when more stable pending course     Marco A Thompson  7/6/2017  1:52 PM

## 2017-07-06 NOTE — ED PROVIDER NOTES
Patient Seen in: [facility] Emergency Department    History   Patient presents with:  Congestive Heart Failure (cardiovascular)    Stated Complaint: increased swelling to legs    HPI    68-year-old white female who presents to the emergency room tod and excision of anal tag                byDr. Rosette Kumar at [address]  2005: PAP IG, CT      Comment: normal    Medications :   bumetanide 1 MG Oral Tab,  Take 1 mg by mouth 2 (two) times daily.    Levothyroxine Sodium 88 MCG Oral Tab,  Take 88 mcg by mouth female who is sitting on the gurney, she is awake and alert and appears in no acute discomfort or distress. Vital signs show she is tachycardic otherwise vital signs are stable she is afebrile    Head is normocephalic atraumatic conjunctiva is clear.   Venida Lamp ---------                               -----------         ------                     CBC W/ DIFFERENTIAL[[id number]]          Abnormal            Final result                 Please view results for these tests on the individual orders.    TYPE AND SCREE Patient was placed on continuous nebulizer treatment and her air exchange improved. For her tachycardia which appeared to be multifocal atrial tachycardia I did contact cardiology and spoke with Dr. David Orta and he wanted the patient to get 30 mg of p.o.

## 2017-07-06 NOTE — PLAN OF CARE
CARDIOVASCULAR - ADULT    • Maintains optimal cardiac output and hemodynamic stability Progressing    • Absence of cardiac arrhythmias or at baseline Progressing        HEMATOLOGIC - ADULT    • Maintains hematologic stability Progressing        RESPIRATORY

## 2017-07-06 NOTE — ED INITIAL ASSESSMENT (HPI)
C/o worsening edema to feet and legs the past week and now difficulty walking. Pt appears mildly SOB but states this is her norm.  Pt on home O2 at 7liters

## 2017-07-06 NOTE — PROGRESS NOTES
[facility] Pharmacy Note: Antimicrobial Weight Dose Adjustment for: Ancef (cefazolin)    Chris Rivas is a 69year old female who has been prescribed Ancef (cefazolin) 1g iv q8. Pt has a weight/BMI of 104.3kg/42.  Based on this criteria and renal function, dose

## 2017-07-06 NOTE — H&P
[facility]  History and Physical     Ina Belcher Patient Status:  Emergency    1950 MRN [id number]   Location [facility] [facility] Attending Veronica San MD   Hosp Day # 0 PCP Salina Miller MD     Chief Complaint: Leg Chepe  3/4/14: OTHER SURGICAL HISTORY      Comment: anal fistulotomy and excision of anal tag                byDr. Nisreen Presley at [facility]  2005: PAP IG, CT      Comment: normal    Social History:  reports that she quit smoking about 12 years ago.  Her smok Pulse 116   Temp 98.8 °F (37.1 °C) (Temporal)   Resp 22   Wt 230 lb (104.3 kg)   SpO2 100%   BMI 42.07 kg/m²   General: No acute distress. Alert and oriented x 3. HEENT: Normocephalic atraumatic. Moist mucous membranes.   Respiratory: (+) Exp wheeze  Card

## 2017-07-07 NOTE — PLAN OF CARE
CARDIOVASCULAR - ADULT    • Maintains optimal cardiac output and hemodynamic stability Progressing    • Absence of cardiac arrhythmias or at baseline Progressing        HEMATOLOGIC - ADULT    • Maintains hematologic stability Progressing        Impaired Ac

## 2017-07-07 NOTE — CONSULTS
[facility]                       Gastroenterology [address] [facility]    Camilo Perez Patient Status:  Inpatient    1950 MRN [id number]   [facility] 2NE-A Attending Sabrina Galdamez MD   [address] [facility] disorder of thyroid        PSHx: Past Surgical History:  No date: CATARACT      Comment: bilateral with lenses  2003: COLONOSCOPY,DIAGNOSTIC      Comment: Dr. Willy Melgoza, hyperplastic polyp  2002: HEMORRHOIDECTOMY      Comment: banding  No date: OTHER      Com injection 2 mg 2 mg Intravenous Once       Allergies:   Augmentin [Amoxicil*        Comment:Diarrhea  Penicillins                 Comment:The patient does not use penicillin    SocHx:    Social History  Social History   Marital status:   Spouse name (Oral)   Resp 20   Wt 235 lb 0.2 oz (106.6 kg)   SpO2 96%   BMI 42.98 kg/m²     Gen: AAO x 3, able to speak in complete sentences    HENT: EOMI, PERRL, oropharynx is clear with moist mucosal membranes    Eyes: Sclerae are anicteric    Neck:  Supple without used to evaluate the lower extremity venous system.  B-mode two-dimensional images of the vascular structures, Doppler spectral analysis, and color flow.  Doppler imaging were performed.  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

## 2017-07-07 NOTE — PROGRESS NOTES
[facility]  Progress Note     Andre Ferrell Patient Status:  Inpatient    1950 MRN [id number]   [facility] 2NE-A Attending Yuliet Mart MD   Hosp Day # 1 PCP David Terrell MD     Chief Complaint: Volume overload    S: Patient Fluticasone Furoate-Vilanterol  1 puff Inhalation Daily   • Umeclidinium Bromide  1 puff Inhalation Daily   • ceFAZolin  2 g Intravenous Q8H   • pantoprazole (PROTONIX) IV push  40 mg Intravenous Q12H   • DilTIAZem HCl  30 mg Oral 4 times per day       ASS

## 2017-07-07 NOTE — PLAN OF CARE
CARDIOVASCULAR - ADULT    • Maintains optimal cardiac output and hemodynamic stability Progressing    • Absence of cardiac arrhythmias or at baseline Progressing        HEMATOLOGIC - ADULT    • Maintains hematologic stability Progressing        Patient/Fam

## 2017-07-07 NOTE — OCCUPATIONAL THERAPY NOTE
OCCUPATIONAL THERAPY EVALUATION - INPATIENT     Room Number: 2647/4870-V  Evaluation Date: 7/7/2017  Type of Evaluation: Initial  Presenting Problem: COPD Exacerbation    Physician Order: IP Consult to Occupational Therapy  Reason for Therapy: ADL/IADL Dys 3/2013   • COPD     o2 4l/cannula in house/ 5. outside house   • COPD (chronic obstructive pulmonary disease) (HCC)     on chronic O2   • Edema extremities     legs   • Glucose intolerance (impaired glucose tolerance)    • Hypokalemia    • Other specified ASSESSMENT  Ratin  Location: [place]   Management Techniques: Relaxation    COGNITION  Overall Cognitive Status:  Impaired  Perseveration: perseverates during conversation    VISION  Current Vision: wears glasses all the time    PERCEPTION  Overall Perc 30. 6  CMS Modifier (G-Code): CL    FUNCTIONAL TRANSFER ASSESSMENT  Supine to Sit : Dependent assistance (X2)  Sit to Stand: Dependent assistance (X2)    Skilled Therapy Provided: Pt presented at EOB upon entering room for assessment.  Anxious with all attem Recommendations: Shower chair;Hospital bed; Toileting aid    PLAN  OT Treatment Plan: Balance activities; ADL training;Energy conservation/work simplification techniques; Compensatory technique education;Equipment eval/education;Patient/Family training;Patien

## 2017-07-07 NOTE — PROGRESS NOTES
· [facility] Progress Note     Subjective:  Does not feel breathing is any better since admission    Objective:  107/37  Afebrile  SR/ST w/ PACs, MAT  I/O incomplete, weight up 5#?    BUN/cr 10/1.01    Neuro:awake/alert  HEENT:unable to assess

## 2017-07-07 NOTE — PHYSICAL THERAPY NOTE
PHYSICAL THERAPY EVALUATION - INPATIENT     Room Number: 0480/2351-Z  Evaluation Date: 7/7/2017  Type of Evaluation: Initial  Physician Order: PT Eval and Treat    Presenting Problem: celluliltis,, COPD, GI bleed  Reason for Therapy: Mobility Dysfuncti Equipment: Rolling walker  Patient Regularly Uses: Glasses    Prior Level of [place]: reports she has not left home in over a year and stays up in her bedroom and  brings food, sponge bathes and walks with help 12' to bathroom    SUBJECTIVE  \" with activity 96%  O2 Device: Nasal cannula  Liters of O2:  5  Shortness of breath    AM-PAC '6-Clicks' INPATIENT SHORT FORM - BASIC MOBILITY  How much difficulty does the patient currently have. ..  -   Turning over in bed (including adjusting bedclothes, Patient is a 69year old female admitted 7/6/2017 for LE edema, COPD and cellulitis.    In this PT evaluation, the patient presents with the following impairments: increased pain, sweling and anxiety, decreased strength, endurance and force generating c

## 2017-07-07 NOTE — PROGRESS NOTES
[facility]  Progress Note    Margo Kurtz Patient Status:  Inpatient    1950 MRN [id number]   [facility] 2NE-A Attending Malik Guerra MD   Hosp Day # 1 PCP Lianne Lozano MD     Subjective:  Margo Kurtz is a(n) 69year old fema Lab  07/06/17   1157  07/07/17   0549   GLU  101*  110*   BUN  8  10   CREATSERUM  0.68  1.01   CA  9.2  9.3   NA  139  139   K  4.6  4.3   CL  101  100*   CO2  31.0  31.0     Recent Labs   Lab  07/06/17   1259   ABGPHT  7.36   [id number]  61*   [id number]  9

## 2017-07-07 NOTE — CONSULTS
[facility]  Report of Consultation    Tamika Thomas Patient Status:  Inpatient    1950 MRN [id number]   [facility] 2NE-A Attending Damaris Guevara MD   Hosp Day # 0 PCP Musa Reid MD     Reason for Consultation:  Edema, dyspnea, Comment: banding  No date: OTHER      Comment: bunion   No date: OTHER SURGICAL HISTORY      Comment: foot surgery  11/5/13: OTHER SURGICAL HISTORY      Comment: anal fistulotomy, placement of seton, anoscopy               with biopsy by Dr. Tevin fair Pantoprazole Sodium (PROTONIX) 40 mg in Sodium Chloride 0.9 % 10 mL IV push, 40 mg, Intravenous, Q12H    Review of Systems:  All systems were reviewed and are negative except as described above in HPI.     Physical Exam:  Blood pressure 113/42, pulse 110, t Right upper lobe fibrotic scars with bullous emphysema associated with pulmonary hypoinflation and bibasilar atelectasis.     Dictated by: Ephraim Chris MD on 7/06/2017 at 13:01     Approved by: Ephraim Chris MD            Us Venous Doppler Leg Alonzo  Evy Williamson heart failure with advanced COPD from heavy smoking history. CO2 retention. No evidence of DVT by US. 25 lb wt gain / 6 months. 2. Dyspnea and mildly productive cough. Occasional wheezes.   3. Tachycardia - reactive - multifocal atrial tachycardia - no raheem

## 2017-07-08 NOTE — PROGRESS NOTES
· [facility] Progress Note     Subjective:  No real change since admission. Feels the same as at home despite 2 liter diuresis. No cp. Ho resting dyspnea.      Objective:  117/35   Afebrile  SR/ST w/ PACs, MAT  I/O - 9462    BUN/cr 11/1.15

## 2017-07-08 NOTE — PROGRESS NOTES
[facility]  Progress Note     Camilo Perez Patient Status:  Inpatient    1950 MRN [id number]   [facility] 2NE-A Attending Sabrina Galdamez MD   Hosp Day # 2 PCP Shalini Jenkins MD     Chief Complaint: Volume overload    S: Patient chlorothiazide  500 mg Intravenous Daily    And   • Sterile Water for Injection  18 mL Intravenous Daily   • Sterile Water for Injection  18 mL Intravenous Daily   • Fluticasone Furoate-Vilanterol  1 puff Inhalation Daily   • Umeclidinium Bromide  1 puff I

## 2017-07-08 NOTE — PROGRESS NOTES
[facility]  Progress Note    Harjinder Coronado Patient Status:  Inpatient    1950 MRN [id number]   [facility] 2NE-A Attending Kalyani Sifuentes MD   Hosp Day # 2 PCP Maximiliano Nelson MD     Subjective:  Harjinder Coronado is a(n) 69year old fema 0411   GLU  101*  110*  94   BUN  8  10  11   CREATSERUM  0.68  1.01  1.15*   CA  9.2  9.3  8.8   NA  139  139  141   K  4.6  4.3  2.6*   CL  101  100*  98*   CO2  31.0  31.0  35.0*     Recent Labs   Lab  07/07/17   0740   ABGPHT  7.38   [id number]  55*   AB

## 2017-07-08 NOTE — BH PROGRESS NOTE
[facility] Resource Referral Counselor Note    Yana Trejo Patient Status:  Inpatient    1950 MRN [id number]   [facility] 2NE-A Attending Edison Duenas MD   Hosp Day # 2 PCP Becky Jensen MD       S(subjective) Pt presents

## 2017-07-09 NOTE — PROGRESS NOTES
[facility]  Progress Note    Silke Conde Patient Status:  Inpatient    1950 MRN [id number]   [facility] 2NE-A Attending Era Acuña MD   Hosp Day # 3 PCP Eileen Cardoza MD     Subjective:  Silke Conde is a(n) 69year old fema 16.2*  16.0   NEPRELIM  12.03*  10.01*  10.52*   WBC  16.5*  13.7*  14.2*   PLT  285.0  249.0  246.0     Recent Labs   Lab  07/07/17   0549  07/08/17   0411  07/09/17   0538   GLU  110*  94  98   BUN  10  11  11   CREATSERUM  1.01  1.15*  1.19*   CA  9.3

## 2017-07-09 NOTE — DIETARY NOTE
Nutrition Short Note    Dietitian consult received for heart failure education. Appropriate education and handout provided. All questions answered. RD available PRN.     Maren Culver RD, LDN, CNSC

## 2017-07-09 NOTE — CM/SW NOTE
07/09/17 1500   CM/SW Referral Data   Referral Source Physician   Reason for Referral Discharge planning;Psychoscial assessment   Informant Patient   Pertinent Medical Hx   Primary Care Physician Name Merced Westbrook MD   Patient Info   Patient's Mental St

## 2017-07-09 NOTE — PROGRESS NOTES
· [facility] Progress Note     Subjective:  Minimal improvement in dyspnea, still marked edema. No cp.      Objective:  94/43   Afebrile  SR/ST w/ PACs    I/O - 361  Weight unchanged   BUN/cr 11/1.19   Hgb 8.5      Neuro:awake/alert; nml affect

## 2017-07-09 NOTE — PROGRESS NOTES
[facility]  Progress Note     Deepthi Teran Patient Status:  Inpatient    1950 MRN [id number]   [facility] 2NE-A Attending Keshawn Sparks MD   Hosp Day # 3 PCP Norvel Lefort, MD     Chief Complaint: Volume overload    S: Patient sucrose  200 mg Intravenous Daily   • chlorothiazide  500 mg Intravenous Daily    And   • Sterile Water for Injection  18 mL Intravenous Daily   • Sterile Water for Injection  18 mL Intravenous Daily   • Fluticasone Furoate-Vilanterol  1 puff Inhalation Da

## 2017-07-10 NOTE — PROGRESS NOTES
[facility]  Progress Note    Deepthi Teran Patient Status:  Inpatient    1950 MRN [id number]   [facility] 2NE-A Attending Chaya Doll MD   Hosp Day # 4 PCP Norvel Lefort, MD     Subjective:  Deepthi Teran is a(n) 69year old fema 30.0*   RDW  16.2*  16.0   --   15.9   NEPRELIM  10.01*  10.52*   --   12.31*   WBC  13.7*  14.2*   --   16.2*   PLT  249.0  246.0   --   260.0     Recent Labs   Lab  07/08/17   0411  07/09/17   0538  07/09/17   2053  07/10/17   0626   GLU  94  98   --   1

## 2017-07-10 NOTE — PROGRESS NOTES
[facility]  Cardiology Progress Note    Brii Preciado Patient Status:  Inpatient    1950 MRN [id number]   [facility] 2NE-A Attending Jackie Tristan MD   Hosp Day # 4 PCP Maki Hidalgo MD     Subjective:  Complains of shortness of b could not be accurately determined. Systolic function was     normal.  4. Pulmonary arteries: Systolic pressure could not be accurately estimated. 5. Pericardium, extracardiac: There was no pericardial effusion.   Impressions:  No previous study was availa Seen by GI - Protonix/Venofer. Possible EGD/colon once clinically improved   · Hx GI bleed  · Hypothyroid on replacement.   TSH up, Synthroid adjusted  · Diffuse fatty infiltration of liver, cholelithiasis  · Obesity        Plan:     · Bumex gtt increased

## 2017-07-10 NOTE — PLAN OF CARE
Impaired Activities of Daily Living    • Achieve highest/safest level of independence in self care Not Progressing        RESPIRATORY - ADULT    • Achieves optimal ventilation and oxygenation Not Progressing          CARDIOVASCULAR - ADULT    • Maintains o

## 2017-07-10 NOTE — PROGRESS NOTES
[facility]  Progress Note     Ina Belcher Patient Status:  Inpatient    1950 MRN [id number]   [facility] 2NE-A Attending Hernandez Smith MD   Hosp Day # 4 PCP Salina Miller MD     Chief Complaint: Volume overload    S: Patient mEq Oral Q4H   • bumetanide  2 mg Intravenous Once   • magnesium sulfate  2 g Intravenous Once   • chlorothiazide  500 mg Intravenous BID    And   • Sterile Water for Injection  18 mL Intravenous Daily   • ipratropium-albuterol  3 mL Nebulization QID   • e

## 2017-07-10 NOTE — CM/SW NOTE
SW met with patient to follow up regarding Home Health agency. Patient stated her  has not yet found the agency name and phone number. SW provided contact information for  to call once agency is known.     Magnolia Gonzales MSW, LSW  P: [phone]

## 2017-07-11 NOTE — PROGRESS NOTES
[facility]  Progress Note    Toni Moses Patient Status:  Inpatient    1950 MRN [id number]   [facility] 2NE-A Attending Christa Dueñas MD   Hosp Day # 5 PCP Darin Villela MD     Subjective:  Toni Moses is a(n) 69year old fema WBC  16.2*  17.2*  19.1*   PLT  260.0  290.0  294.0     Recent Labs   Lab  07/10/17   1445  07/11/17   0042  07/11/17   0600   GLU  103*  110*  85   BUN  12  13  13   CREATSERUM  1.25*  1.21*  1.21*   CA  8.9  9.0  8.8   NA  138  139  140   K  3.1*  3.3*

## 2017-07-11 NOTE — PROGRESS NOTES
[facility]  Progress Note     Rudie Fleeting Patient Status:  Inpatient    1950 MRN [id number]   [facility] 2NE-A Attending Christa Dueñas MD   Hosp Day # 5 PCP Darin Villela MD     Chief Complaint: Volume overload    S: Patient interval not displayed. Estimated Creatinine Clearance: 34.4 mL/min (based on SCr of 1.21 mg/dL). Recent Labs   Lab  07/10/17   1445   PTP  19.5*   INR  1.63*       No results for input(s): TROP, CK in the last 72 hours.       Lab Results  Componen

## 2017-07-11 NOTE — CM/SW NOTE
SW received call from patient's , Samantha Delgado ([phone]) stating patient's [address] is [facility]. Herbertjackiethompson Delgado stated they'd like to continue to work with [facility] upon discharge.  Resumption of Care orders entered in Epic, paged to [facility].

## 2017-07-11 NOTE — CARDIAC REHAB
HF education initiated with patient. Patient tired and asked if staff could come back later. Will revisit as time allows.

## 2017-07-11 NOTE — PHYSICAL THERAPY NOTE
Attempted to see pt at this time, but the pt is undergoing ultrafiltration. Will re-attempt to see pt again at a later time.

## 2017-07-11 NOTE — PROGRESS NOTES
[facility]  Cardiology Progress Note    Bryan Childress Patient Status:  Inpatient    1950 MRN [id number]   [facility] 2NE-A Attending Adi Rothman MD   Hosp Day # 5 PCP Elia Bhatia MD     Subjective:  Breathing a bit better, No ipratropium-albuterol  3 mL Nebulization QID   • Levothyroxine Sodium  100 mcg Oral Before breakfast   • Sterile Water for Injection  18 mL Intravenous Daily   • Fluticasone Furoate-Vilanterol  1 puff Inhalation Daily   • Umeclidinium Bromide  1 puff Inhal Programs  Medical Director, [address] [facility]  Franklyn Mckeon [address], [address] [facility], [address] [address]  Phone: [phone]  Fax: [phone]  E-mail: [email or web address]

## 2017-07-12 NOTE — PLAN OF CARE
Problem: CARDIOVASCULAR - ADULT  Goal: Maintains optimal cardiac output and hemodynamic stability  INTERVENTIONS:  - Monitor vital signs, rhythm, and trends  - Monitor for bleeding, hypotension and signs of decreased cardiac output  - Evaluate effectivenes Monitor for areas of redness and/or skin breakdown  - Initiate interventions, skin care algorithm/standards of care as needed   Outcome: Progressing      Problem: HEMATOLOGIC - ADULT  Goal: Maintains hematologic stability  INTERVENTIONS  - Assess for signs

## 2017-07-12 NOTE — PLAN OF CARE
Patient up to [facility] and offering may excuses to get up to the chair. Patient refusing to get up and did explain that MD would need to be made aware of refusal to get up to chair and wash. Patient very weak and flopping onto [facility].

## 2017-07-12 NOTE — PHYSICAL THERAPY NOTE
Attempted to see pt at this time, but the pt is undergoing ultrafiltration. Will plan to re-attempt to see pt again at a later time.

## 2017-07-12 NOTE — PLAN OF CARE
Pt. Is alert but drowsy. Lungs diminished on auscultation. Pt. Is on ultrafiltration at a rate of 200 cc/hr off. Pt's lower extremities with four plus edema. Pt. Is sinus rhythm on monitor. Her abdomen is distended, firm. . O2 at 7 liters nasal cannula at p

## 2017-07-12 NOTE — PLAN OF CARE
Problem: CARDIOVASCULAR - ADULT  Goal: Maintains optimal cardiac output and hemodynamic stability  INTERVENTIONS:  - Monitor vital signs, rhythm, and trends  - Monitor for bleeding, hypotension and signs of decreased cardiac output  - Evaluate effectivenes Monitor for areas of redness and/or skin breakdown  - Initiate interventions, skin care algorithm/standards of care as needed   Outcome: Progressing      Problem: HEMATOLOGIC - ADULT  Goal: Maintains hematologic stability  INTERVENTIONS  - Assess for signs TO [facility] AND VOIDED 200 CC URINE. AT 06:35 AM UF RATE RESUMED 100/HR.

## 2017-07-12 NOTE — PROGRESS NOTES
[facility]  Progress Note    Yisel Cavazos Patient Status:  Inpatient    1950 MRN [id number]   [facility] 2NE-A Attending Suma Carnes MD   [facility] Day # 6 PCP Adonis Quesada MD     Subjective:  Yisel Cavazos is a(n) 69year old femal 16.3*  16.2*  16.6*   NEPRELIM  12.31*  12.83*  14.50*   --    WBC  16.2*  17.2*  19.1*  22.4*   PLT  260.0  290.0  294.0  316.0     Recent Labs   Lab  07/11/17   1638  07/11/17   2141  07/12/17   0549   GLU  112*  122*  94  94   BUN  14  14  15  15   CREA

## 2017-07-12 NOTE — OCCUPATIONAL THERAPY NOTE
IP OT attempt to see patient for therapeutic treatment. Pt currently busy with Ultrafiltration at this time. Will attempt again as able.     KALIA Bhat, OTR/L  Master of Occupational Therapy  Occupational Therapist, Registered/Licensed

## 2017-07-12 NOTE — PROGRESS NOTES
[facility]  Progress Note     Tim Blackwell Patient Status:  Inpatient    1950 MRN [id number]   [facility] 2NE-A Attending Enriqueta Mitchell MD   Hosp Day # 6 PCP Alejandro Carlos MD     Chief Complaint: Volume overload    S: Patient Labs   Lab  07/10/17   1445   PTP  19.5*   INR  1.63*       No results for input(s): TROP, CK in the last 72 hours. Imaging: Imaging data reviewed in Epic.     Medications:   • spironolactone  25 mg Oral Daily   • DilTIAZem HCl  30 mg Oral [facility]

## 2017-07-12 NOTE — PROGRESS NOTES
[facility]  Cardiology Progress Note    Neri Kaminski Patient Status:  Inpatient    1950 MRN [id number]   [facility] 2NE-A Attending Dru Barros MD   Hosp Day # 6 PCP Francis Krishna MD     Subjective:  Breathing a bit better, No mcg Oral Daily   • ipratropium-albuterol  3 mL Nebulization QID   • Levothyroxine Sodium  100 mcg Oral Before breakfast   • Sterile Water for Injection  18 mL Intravenous Daily   • Fluticasone Furoate-Vilanterol  1 puff Inhalation Daily   • Umeclidinium Br hepatic as well. Although cardiac suspected more. Tachy resolved with CCB. Not a good candidate for BB or amio. CXR chronic COPD like changes, but no lung congestion.  Since she does well now with UF, we will continue UF at current rate with anticoagulation

## 2017-07-13 NOTE — PLAN OF CARE
Pt refuses to get up for us to obtain her standing weight & pt refuses to try to get up to get on [facility] - pt has not voided since 2025 last night. Pt states she will get up for weight & attempt to void \"a little later on. \"  Stephen Silva within reach.

## 2017-07-13 NOTE — PLAN OF CARE
Problem: Patient/Family Goals  Goal: Patient/Family Long Term Goal  Patient's Long Term Goal: Return home at regular level of activity.     Interventions:  - Ambulate TID, up to chair as able for meals  - See additional Care Plan goals for specific interven broncho-pulmonary hygiene including cough, deep breathe, Incentive Spirometry  - Assess the need for suctioning and perform as needed  - Assess and instruct to report SOB or any respiratory difficulty  - Respiratory Therapy support as indicated  - Manage/a

## 2017-07-13 NOTE — PLAN OF CARE
DX: CHF on UF @ 100/hr with blood flow of 40 - Heparin gtt per UF protocol    Data:  Pt lying in bed at approx 60 degree angle. Pt c/o 7/10 BLE pain & 7/10 \"pain with breathing. \"  Pt c/o SOB @ rest & with exertion. Pt denies any nausea.   VS: 127/50 HR

## 2017-07-13 NOTE — PROGRESS NOTES
[facility]  Cardiology Progress Note    Stormy Baum Patient Status:  Inpatient    1950 MRN [id number]   [facility] 2NE-A Attending Dalila Ahumada, MD   Hosp Day # 7 PCP Gideon Suarez MD     Subjective:  Doing better, doesn't like • digoxin  125 mcg Oral Daily   • ipratropium-albuterol  3 mL Nebulization QID   • Levothyroxine Sodium  100 mcg Oral Before breakfast   • Fluticasone Furoate-Vilanterol  1 puff Inhalation Daily   • Umeclidinium Bromide  1 puff Inhalation Daily   • ceFAZ stable. We will continue UF at 100 cc/hr of fluid removal. Hope she will be patient with treatment. Tachycardia resolved with CCB initiated on admission. No a good candidate for bb or amiodarone. Leg edema makes leg neuropathy worse.  Dyspnea - from advance

## 2017-07-13 NOTE — PLAN OF CARE
Patient refused to sit on commode prior to going back to bed and refused to attempt to urinate on the bedpan once in bed this eveing.

## 2017-07-13 NOTE — PROGRESS NOTES
[facility] Note: Route Optimization for Pantoprazole (PROTONIX)    Patient is currently on Pantoprazole (PROTONIX) 40 mg IV every 12 hours.    The patient meets the criteria to convert to the oral equivalent as established by the IV to Oral conversion pro

## 2017-07-14 NOTE — OCCUPATIONAL THERAPY NOTE
IP OT attempt to see patient for therapeutic treatment. RN states that patient is currently receiving Ultra-Filtration  Will attempt again as able one more time on Monday, 7/17.  If unavailable , or does not wish to participate, Pt will be DC form IP OT Se

## 2017-07-14 NOTE — CONSULTS
[facility]  Report of Psychiatric Consultation    Bhupinder Torres Patient Status:  Inpatient    1950 MRN [id number]   [facility] 2NE-A Attending Suellen Gayle MD   [facility] Day [facility] PCP Kelly Gaines MD     Date of Admission: 17  Date thinks \"it would be nice not to be alive, and not have to suffer\". She then thinks about her  and says she would never try to hurt herself. She will talk to her  or best friend Terryjustice Morrison when feeling down.  She struggles with the loss of indepe Dr. Lisa Will at [address]  3/4/14: OTHER SURGICAL HISTORY      Comment: anal fistulotomy and excision of anal tag                byDr. Lisa Will at [address]  2005: PAP IG, CT      Comment: normal  Family History   Problem Relation Age of Onset   • Breast Neurological: Positive for weakness. Psychiatric/Behavioral: Positive for depression. Negative for suicidal ideas. The patient is nervous/anxious and has insomnia.       Mental Status Exam:     Risk Assessment  Suicidal ideation: no suicidal ideation at

## 2017-07-14 NOTE — BH PROGRESS NOTE
This writer spoke to [facility] who states he will see this pt and will notify liaison if anything is needed.

## 2017-07-14 NOTE — PROGRESS NOTES
[facility]  Progress Note     Sha Mccain Patient Status:  Inpatient    1950 MRN [id number]   [facility] 2NE-A Attending Luciana Epley, MD   Hosp Day # 8 PCP Clive Turpin MD     Chief Complaint: Volume overload    S: Patient Oral Before breakfast   • Fluticasone Furoate-Vilanterol  1 puff Inhalation Daily   • Umeclidinium Bromide  1 puff Inhalation Daily       ASSESSMENT / PLAN:     1.  Severe COPD with chronic respiratory failure and anasarca d/t right heart failure possible n

## 2017-07-14 NOTE — PLAN OF CARE
Pt a/ox4. 6L Hi Flow NC O2. Congested cough with thick productive yellow sputum. Duonebs QID. Crackles to lungs posteriorly. NSR. Right IJ dressing c/d/i. UF rate at 50mL/hr and Blood Flow at 40. EF = 60-65%. 4+ pitting edema to BLE.  Large blister to right

## 2017-07-14 NOTE — PROGRESS NOTES
[facility]  Progress Note    Ina Belcher Patient Status:  Inpatient    1950 MRN [id number]   [facility] 2NE-A Attending Ren Ni MD   [address] Day # 8 PCP Salina Miller MD     Subjective:  Ina Belcher is a(n) 69year old femal 17.2*   NEPRELIM   --   12.26*  12.22*   WBC  22.4*  18.0*  19.1*   PLT  316.0  231.0  245.0     Recent Labs   Lab  07/13/17   0650  07/13/17   1430  07/14/17   0555   GLU  78  79  121*  81  80   BUN  17  17 19 19  18   CREATSERUM  1.22*  1.22*  1.36*  1

## 2017-07-14 NOTE — PROGRESS NOTES
[facility]  Progress Note    Shavonne Reed Patient Status:  Inpatient    1950 MRN [id number]   [facility] 2NE-A Attending Nahomi Horowitz MD   Hosp Day # 8 PCP Jacob Foster MD     Subjective:  Hard to tell whether the patients feels 1.63 (H) 07/10/2017     No results for input(s): BNPML in the last 72 hours.     BUN (mg/dL)   Date Value   07/14/2017 19   07/14/2017 18   07/13/2017 19   02/21/2014 9   11/01/2013 10   04/12/2013 14   ----------  CREATININE (mg/dL)   Date Value   02/21/20 or lower UF rate throughout the weekend, unless there is SYMPTOMATIC hypotension  2. Continue spironolactone at the current dose which I have increased to 50 mg BID  3. I will call Dr. Brenda Harrison for depression.     Samina Coburn M.D., F.A.C.C., F.A.H.A.,

## 2017-07-14 NOTE — PROGRESS NOTES
[facility]  Progress Note    Antonia Heath Patient Status:  Inpatient    1950 MRN [id number]   [facility] 2NE-A Attending Bibiana Reza MD   Hosp Day # 7 PCP Roberto Gloria MD     Subjective:  Antonia Heath is a 69year old female. CREATSERUM  1.28*  1.22*  1.22*  1.36*   CA  8.7  9.0  9.2  8.9   NA  138  138  139  139   K  3.8  3.7  3.6  3.6   CL  101  101  101  100*   CO2  33.0*  33.0*  33.0*  33.0*     Recent Labs   Lab  07/11/17   0917   ABGPHT  7.41   [id number]  54*   [id number]

## 2017-07-14 NOTE — PLAN OF CARE
Patient went back to bed prior to 7P shift and remained on bedrest the rest of the shift. Patient did not void from 2000 to 0700. Right IJ catheter patent. dressing dry and intact. No redness or drainage noted.   Ultrafiltration was reduced to 50% due to dec

## 2017-07-14 NOTE — PROGRESS NOTES
PSYCH CONSULT    Date of Admission: 7/6/17  Date of Consult: 7/14/17  Reason for Consultation: Depression    Impression:  AXIS 1: Major depressive disorder, single episode, severe. Anxiety disorder due to her COPD/CHF.      AXIS 2: Deferred    AXIS 3: HFp

## 2017-07-15 NOTE — PLAN OF CARE
A/Ox4.  Flat affect. Zoloft started. Tele-NSR. Wt-217.8 on bed scale. Pt not steady enough to stand for weight. Continues on UF. Right IJ catheter dressing intact. Continues with +4 pitting edema bilateral lower extremities thigh to feet.   Legs are

## 2017-07-15 NOTE — PROGRESS NOTES
[facility]  Progress Note     John Ellis Patient Status:  Inpatient    1950 MRN [id number]   [facility] 2NE-A Attending Kimmie Kelley MD   Hosp Day # 9 PCP Hunter Lee MD     Chief Complaint: Volume overload    S: Patient Fluticasone Furoate-Vilanterol  1 puff Inhalation Daily   • Umeclidinium Bromide  1 puff Inhalation Daily       ASSESSMENT / PLAN:     1.  Severe COPD with chronic respiratory failure and anasarca d/t right heart failure possible nephrotic syndrome - negati

## 2017-07-15 NOTE — PROGRESS NOTES
[facility]  Progress Note    Kya Cornelius Patient Status:  Inpatient    1950 MRN [id number]   [facility] 2NE-A Attending Orin Buck MD   [facility] Day # 9 PCP Tavo Vasquez MD     Subjective:  Kya Cornelius is a(n) 69year old femal Cultures: fungal     Radiology:  rviewed       Medications reviewed     Assessment and Plan:   Patient Active Problem List:     Hypothyroidism     COPD (chronic obstructive pulmonary disease) ([address]     Aspergillus pneumonia ([address]     Anal fistula

## 2017-07-15 NOTE — PROGRESS NOTES
[facility] Cardiology Progress Note    Subjective:  Sleeping, arousable yet seems tired.      Objective:  /45 (BP Location: Left arm)   Pulse 75   Temp 97.8 °F (36.6 °C)   Resp 18   Ht 157 cm (5' 1.81\")   Wt 217 lb 12.8 oz (98.8 kg)   SpO2 100%   BMI 40

## 2017-07-16 NOTE — PROGRESS NOTES
[facility]  Progress Note     Yisel  Patient Status:  Inpatient    1950 MRN [id number]   [facility] 2NE-A Attending Ira Kumar MD   Hosp Day # 10 PCP Adonis Quesada MD     Chief Complaint: Volume overload    S: Patient Daily   • Umeclidinium Bromide  1 puff Inhalation Daily       ASSESSMENT / PLAN:     1. Severe COPD with chronic respiratory failure and anasarca d/t right heart failure possible nephrotic syndrome - negative balance  2. Contraction metabolic alkalosis  1.

## 2017-07-16 NOTE — PLAN OF CARE
UF Infusing without difficulty. Pt. Tolerated it well. UF rate 50ml/hr. Flow 40. Called 1800 BMP results ot [facility]. Continue present rate per Dr. Janell Uribe orders. Next BMP in the AM.  at the bedside. Dr. Rony Echevarria aware of ABG results.  Continue

## 2017-07-16 NOTE — PROGRESS NOTES
[facility]  Progress Note    Maggie Taylor Patient Status:  Inpatient    1950 MRN [id number]   [facility] 2NE-A Attending Jovita Vera MD   Hosp Day # 10 PCP Janice Cohen MD     Subjective:  Maggie Taylor is a(n) 69year old fema Problem List:     Hypothyroidism     COPD (chronic obstructive pulmonary disease) (HCC)     Aspergillus pneumonia (HCC)     Anal fistula     COPD exacerbation (HCC)     Hypervolemia     Chronic respiratory failure with hypoxia (HCC)     Sinus tachycardia

## 2017-07-16 NOTE — PROGRESS NOTES
[facility] Cardiology Progress Note    Subjective:  Up in chair.  noticing hands shaky at times. More alert today.      Objective:  BP 94/37 (BP Location: Right arm)   Pulse 75   Temp (!) 97.2 °F (36.2 °C) (Oral)   Resp 20   Ht 157 cm (5' 1.81\")   Wt 2

## 2017-07-16 NOTE — PLAN OF CARE
Patient unable to urinate this afternoon. Labs rechecked at 1600 and bmp stable at this time. Claudette Cai and [place] aware that patient has not urinated. Continue current setting of UF until AM. BMP am. Bladder scan did show a volume of 350.  Patient on be

## 2017-07-16 NOTE — PROGRESS NOTES
Pt. awake , not in any form of distress. O2 sat at 100 % on 7 li high flow ; decrease O2 to 5 li , O2 sat at high 90's. Will cont. Monitor.

## 2017-07-17 NOTE — PROGRESS NOTES
[id number] [facility] Neurology Preliminary Note    Tejas Meza Patient Status:  Inpatient    1950 MRN [id number]   [facility] 2NE-A Attending Dillon Helm MD   Hosp Day # 11 PCP Carroll Coello MD     REASON FOR EVALUATION: tremor    H maternal aunt; Breast Cancer (age of onset: 79) in her mother. SOCIAL HISTORY:   reports that she quit smoking about 12 years ago. Her smoking use included Cigarettes. She has a 87.50 pack-year smoking history.  She has never used smokeless tobacco. She non tender  SKIN: Warm, dry, no rashes, BLE edematous     NEUROLOGICAL:   Mental status: Oriented to person, place, and time   Speech: Fluent, no dysarthria  Memory and comprehension: Intact   Cranial Nerves: VFF, PERRL 3mm brisk, EOMI, remainder CN GI  Mo

## 2017-07-17 NOTE — PLAN OF CARE
Problem: CARDIOVASCULAR - ADULT  Goal: Absence of cardiac arrhythmias or at baseline  INTERVENTIONS:  - Continuous cardiac monitoring, monitor vital signs, obtain 12 lead EKG if indicated  - Evaluate effectiveness of antiarrhythmic and heart rate control m Progressing

## 2017-07-17 NOTE — CONSULTS
[id number] [facility] Neurology Initial Consultation    Tim Blackwell Patient Status:  Inpatient    1950 MRN [id number]   [facility] 2NE-A Attending Bonilla Peck MD   [facility] Day # 11 PCP Alejandro Carlos MD     REASON FOR EVALUATION: tremor Chandana Sultana at [address]  2005: PAP IG, CT      Comment: normal    FAMILY HISTORY:  family history includes Breast Cancer (age of onset: 48) in her maternal aunt; Breast Cancer (age of onset: 79) in her mother.     SOCIAL HISTORY:   reports that she quit smok oz   SpO2 97%   BMI 39.84 kg/m²     Gen: well developed, well nourished, no acute distress  HEENT: normocephalic  Heart; normal I2/Q2, regular rate and rhythm  Lungs: clear to auscultation bilaterally  Extremities: edematous bilateral LE  Abd: soft, nt/nd; 2.3 07/17/2017   PHOS 2.8 07/17/2017              IMAGING:    CT brain:   Ct Brain Or Head ([id number])    Result Date: 7/17/2017  CONCLUSION:  No acute intracranial abnormality identified.  Mild age-indeterminate microvascular ischemic changes in the cerebral w

## 2017-07-17 NOTE — PROGRESS NOTES
[facility]  Progress Note     John Ellis Patient Status:  Inpatient    1950 MRN [id number]   [facility] 2NE-A Attending Kimmie Kelley MD   [facility] Day # 6 PCP Hunter Lee MD     Chief Complaint: Volume overload    S: Patient Oral Daily   • Pantoprazole Sodium  40 mg Oral BID AC   • DilTIAZem HCl  30 mg Oral Q8H [address]   • digoxin  125 mcg Oral Daily   • Levothyroxine Sodium  100 mcg Oral Before breakfast   • Fluticasone Furoate-Vilanterol  1 puff Inhalation Daily       ASSESSMENT

## 2017-07-17 NOTE — PHYSICAL THERAPY NOTE
Attempted to see pt for PT treatment at this time, but the pt is undergoing ultrafiltration. Pt has been attempted for PT many times but due to pt being on Ultrafiltration.  Perr department policy on Ultrafiltration, pt has not been seen for PT.   MD is als

## 2017-07-17 NOTE — PROGRESS NOTES
[facility]  Progress Note    Damaris Oliver Patient Status:  Inpatient    1950 MRN [id number]   [facility] 2NE-A Attending Olivia Altamirano MD   [facility] Day # 6 PCP Sammi Hodgkins, MD     Subjective:  Damaris Oliver is a(n) 69year old fema 07/15/17   0630  07/16/17   0600  07/17/17   0505   RBC  3.25*  3.25*  3.18*   HGB  9.1*  9.3*  8.9*   HCT  30.6*  30.6*  29.7*   MCV  94.2  94.2  93.4   MCH  28.0  28.6  28.0   MCHC  29.7*  30.4*  30.0*   RDW  17.2*  17.6*  17.8*   NEPRELIM  12.37*  12.7 begin Uroxatrol.     Comfort Forde SR.  7/17/2017  9:38 AM

## 2017-07-17 NOTE — CONSULTS
[facility] Urology   Consultation Note    Kelsea Left Patient Status:  Inpatient    1950 MRN [id number]   [facility] 2NE-A Attending William Carrion MD   [address] Day # 6 PCP Donita Blizzard, MD     Reason for Consultation:  Urinary Emely Hightower at [facility]  3/4/14: OTHER SURGICAL HISTORY      Comment: anal fistulotomy and excision of anal tag                Rodrick Hightower at [facility]  2005: PAP IG, CT      Comment: normal  Family History   Problem Relation Age of Onset   • Breast Can 1.81\" (1.57 m)   Wt 216 lb 7.9 oz (98.2 kg)   SpO2 92%   BMI 39.84 kg/m²   GENERAL: Patient is resting in bed in no acute distress. NECK: Supple. ABDOMEN: Obese. The abdomen is soft and nontender without rebound or guarding. No bladder tenderness. List:     Hypothyroidism     COPD (chronic obstructive pulmonary disease) [address]     Aspergillus pneumonia ([facility]     Anal fistula     COPD exacerbation (HCC)     Hypervolemia     Chronic respiratory failure with hypoxia (HCC)     Sinus tachycardia     Winsome Nichols

## 2017-07-17 NOTE — PROGRESS NOTES
[facility]  Progress Note    Arnold Davilla Patient Status:  Inpatient    1950 MRN [id number]   [facility] 2NE-A Attending Nelson Chase MD   [facility] Day # 11 PCP Gypsy Dias MD     Subjective: Worried about tremor;  She is alert and Results  Component Value Date   INR 1.63 (H) 07/10/2017     No results for input(s): BNPML in the last 72 hours.     BUN (mg/dL)   Date Value   07/17/2017 28 (H)   07/17/2017 28 (H)   07/16/2017 28 (H)   02/21/2014 9   11/01/2013 10   04/12/2013 14   ------ ([facility]     Anasarca     Iron deficiency anemia     Cellulitis of lower extremity     Major depressive disorder, single episode, severe ([facility]    We need to increase UF to 100 ml/h;  Continue spironolactone as ordered  Agree with Urology and Neurology consult

## 2017-07-17 NOTE — OCCUPATIONAL THERAPY NOTE
IP OT attempt to see patient for therapeutic treatment. RN states that patient is currently occupied with Ultra Filtration for the morning. Will DC from IP OT services at this time. If new needs arise, please re-consult for re-evaluation.      Andrez Vance

## 2017-07-18 NOTE — PROGRESS NOTES
[facility]  Report of Psychiatric Progress Note    Marc Hernandez Patient Status:  Inpatient    1950 MRN [id number]   [facility] 2NE-A Attending Colleen Carmona MD   [address] Day # 15 PCP Luc Farrell MD     Date of Admission:  17 ultrafiltration.      She admits to feeling very depressed with low energy, mood, appetite, and motivation. She feels that her quality of life has decreased due to her HFeEF on top of her COPD.  She is NOT suicidal at this time, but has had passive suicidal Surgical History:  No date: CATARACT      Comment: bilateral with lenses  2003: COLONOSCOPY,DIAGNOSTIC      Comment: Dr. Rosmery Ruiz, hyperplastic polyp  2002: HEMORRHOIDECTOMY      Comment: banding  No date: OTHER      Comment: bunion   No date: OTHER SURGICAL (LANOXIN) tab 125 mcg, 125 mcg, Oral, Daily  •  Levothyroxine Sodium (SYNTHROID) tab 100 mcg, 100 mcg, Oral, Before breakfast  •  Fluticasone Furoate-Vilanterol (BREO ELLIPTA) 200-25 MCG/INH inhaler 1 puff, 1 puff, Inhalation, Daily  •  acetaminophen (TYLE

## 2017-07-18 NOTE — PROGRESS NOTES
[id number] [facility] Neurology Progress Note    Tamika Thomas Patient Status:  Inpatient    1950 MRN [id number]   [facility] 2NE-A Attending James Rangel MD   [address] Day # 12 PCP Musa Reid MD         Subjective:  Tamika Thomas is central/neurological etiology   1. CTH no acute processes  2. TSH WNL  3. Ammonia elevated, treatment (lactulose) per primary, may be a contributing factor  4. No indication for EEG or MRI at this time  5. No BB due to COPD  2.  COPD, per [facility], albuterol has bradykinesia  Sensory: intact to light touch throughout , though limited by edema  Coord: FNF with +tremor but no dysmetria; rapid alternating movements intact bilaterally  Romberg:deferred    Imaging: no new imaging; prior imaging as noted above       Ass

## 2017-07-18 NOTE — PLAN OF CARE
ANAND Lo notified that patientt is not urinating and that 6 AM bladder scan revealed 176 cc urine (bladder scan yesterday PM was 300 +).  NOtified that urology has been consulted and they are to see the patient this AM.

## 2017-07-18 NOTE — PLAN OF CARE
Problem: Patient/Family Goals  Goal: Patient/Family Long Term Goal  Patient's Long Term Goal: Return home at regular level of activity.     Interventions:  - Ambulate TID, up to chair as able for meals  - See additional Care Plan goals for specific interven handout, if applicable  - Encourage broncho-pulmonary hygiene including cough, deep breathe, Incentive Spirometry  - Assess the need for suctioning and perform as needed  - Assess and instruct to report SOB or any respiratory difficulty  - Respiratory Ther Progressing  Remains very weak and edematous, Ammonia level elevated and neurology aware. PT signed off due to UF. UF continues    Tele SR. BP stable. UF continues. Remains very weak, Up with total lift. Massively edematous. PT signed off due to UF.   Stil

## 2017-07-18 NOTE — PROGRESS NOTES
[facility]  Progress Note     Harjinder Woodr Patient Status:  Inpatient    1950 MRN [id number]   [facility] 2NE-A Attending Kalyani Sifuentes MD   Hosp Day # 15 PCP Maximiliano Nelson MD     Chief Complaint: Volume overload    S: Patient Imaging: Imaging data reviewed in Epic.     Medications:   • nystatin   Topical BID   • Ipratropium Bromide  0.5 mg Nebulization Q8H [facility] HOME   • Alfuzosin HCl ER  10 mg Oral Daily with breakfast   • lactulose  20 g Oral TID   • spironolactone  50 mg Oral appears pt is part of DMG. Discussed case with [facility] hospitalist, Dr. Sandip Isidro and she will continue care for this patient.       Stuart Magallanes MD

## 2017-07-18 NOTE — PROGRESS NOTES
[facility]  Urology Progress Note    Kya Cornelius Patient Status:  Inpatient    1950 MRN [id number]   [facility] 2NE-A Attending Pranay John MD   Psychiatric Day # 12 PCP Tavo Vasquez MD     Subjective:  Kya Cornelius is a(n 69 ye

## 2017-07-18 NOTE — PROGRESS NOTES
Dr. Kendra Hughes notified that [address] of bladder was 339 mls. Will continue to monitor.

## 2017-07-18 NOTE — PROGRESS NOTES
Nursing called with formal bladder ultrasound. There is >300 without any urge sensation to void. Nursing reports her bladder is comfortable.   There is low total uop given her current ultrafiltration use to get the fluid off of her, but not necessarily uri

## 2017-07-18 NOTE — PLAN OF CARE
UF continued tonight at rate of 100 cc/hr. BP and creat stable. Lethargic most of night.   Orientation to person, place, and time- but difficult to elicit the responses from patient as she is lethargic with delayed responses and has difficulty focusin

## 2017-07-18 NOTE — PROGRESS NOTES
[facility]  [facility] Progress Note    Anders Model Patient Status:  Inpatient    1950 MRN [id number]   [facility] 2NE-A Attending Declan Blancas MD   [facility] Day # 12 PCP Ese Caballero MD     Subjective:  She is aler (07/17/17 6340)     • Ipratropium Bromide  0.5 mg Nebulization Q8H [address]   • Alfuzosin HCl ER  10 mg Oral Daily with breakfast   • lactulose  20 g Oral TID   • spironolactone  50 mg Oral BID (Diuretic)   • Sertraline HCl  25 mg Oral Daily   • Pantoprazole So Liya Francisco M.D., F.LUCY.MISTI., LILY., F.E.S.C.   Medical Director, [facility] Heart Failure and Pulmonary Arterial Hypertension Programs  Medical Director, [facility] [address]

## 2017-07-19 PROBLEM — D64.9 ANEMIA: Status: ACTIVE | Noted: 2017-01-01

## 2017-07-19 NOTE — PROGRESS NOTES
[facility] hospitalist daily note  Patient was seen/examined on 7/19/17    S; care was transferred to [facility].  at the bedside during my visit.  Patient failed swallow eval, needs video swallow eval    Medications in EPIC    PE    07/19/17  5805

## 2017-07-19 NOTE — SLP NOTE
Attempted to see for bedside swallow evaluation however patient currently being seen by MD.  Will re-attempt as available and appropriate.

## 2017-07-19 NOTE — BH PROGRESS NOTE
Talked with the psychiatrist and he wanted the pt to have referrals for in home counseling. The patients  Emmie Runner was called and informed. The referrals were left in the room. When the pt was informed she did not respond.   The patients nurse is awar

## 2017-07-19 NOTE — PLAN OF CARE
Problem: Impaired Swallowing  Goal: Minimize aspiration risk  Bedside swallow evaluation completed.   Rec: 1) Vfss 2) NPO  Further goals pending results of exam  Outcome: Progressing

## 2017-07-19 NOTE — PROGRESS NOTES
[facility]  Progress Note    Silke Conde Patient Status:  Inpatient    1950 MRN [id number]   [facility] 2NE-A Attending David Chairez MD   [facility] Day # 15 PCP Eileen Cardoza MD     Subjective:  Silke Conde is a(n) 69year old fema 0600   GLU  97  98  78   BUN  34*  35*  34*   CREATSERUM  1.83*  1.93*  1.89*   CA  9.4  9.4  9.8   NA  132*  132*  132*   K  4.4  4.3  4.3   CL  96*  96*  97*   CO2  29.0  29.0  29.0     Recent Labs   Lab  07/17/17   0721   ABGPHT  7.39   [id number]  52*

## 2017-07-19 NOTE — SLP NOTE
ADULT SWALLOWING EVALUATION    ASSESSMENT & PLAN   ASSESSMENT  Order received for bedside swallow evaluation.   Per chart review:    History of Present Illness: Kelsea Joyner is a 69year old female with a history of COPD, Chronic hypoxic respiratory failur Plan: Videofluoroscopic swallow study  Discharge Recommendations/Plan: Undetermined    HISTORY   MEDICAL HISTORY  Reason for Referral: R/O aspiration    Problem List  Principal Problem:    Right heart failure ([address]  Active Problems:    COPD exacerbation (H Limits  Strength:  (reduced overall )  Tone: Within Functional Limits  Range of Motion: Within Functional Limits  Rate of Motion: Reduced    Voice Quality: Weak  Respiratory Status: Supplemental O2  Consistencies Trialed: Thin liquids; Nectar thick liquids;

## 2017-07-19 NOTE — PROGRESS NOTES
[address] [facility] Neurology Progress Note    Juice Breen Patient Status:  Inpatient    1950 MRN [id number]   [facility] 2NE-A Attending Maryann Arreola MD   [facility] Day # 15 PCP Gerry Panda MD         Subjective:  Juice Breen is a 6 central/neurological etiology   1. CTH no acute processes  2. TSH WNL  3. Ammonia elevated, treatment (lactulose) per primary, may be a contributing factor, trending down slowly, 46 today (47)  4. No indication for EEG or MRI at this time  5.  No BB due to easily distracted      CN: PERRL, EOMI without nystagmus, VFF, smile symmetric, sensation intact, tongue and palate midline, SCM intact; otherwise, 2-12 intact  Motor: 4+/5 B UE; evaluation of lower extremities limited by edema but able to lift feet off be

## 2017-07-19 NOTE — PROGRESS NOTES
[facility] Cardiology Progress Note    Kelsea Left Patient Status:  Inpatient    1950 MRN [id number]   [facility] 2NE-A Attending Andrey Verduzco MD   [facility] Day # 15 PCP Donita Blizzard, MD     Subjective:  Last night [email or web address]   • Ipratropium Bromide  0.5 mg Nebulization Q8H [address]   • Alfuzosin HCl ER  10 mg Oral Daily with breakfast   • lactulose  20 g Oral TID   • spironolactone  50 mg Oral BID (Diuretic)   • Sertraline HCl  25 mg Oral Daily   • Pantoprazole Sodium lactulose. 12. Tremors - Neurology following. 13. Aspiration - NPO now, swallow pending    Plan:  · NPO until swallow  · OT and PT to eval and treat   · Continue UF but increase rate to 100 ml/hr.  Discussed with Dr Natacha Armando  · GI W/U when stable    Grayling Anemia is not helpful here.     D/w pt and the Nurses  Oli Moss / STEPHANE  07/19/2017    45 minute care  6:41 pm

## 2017-07-19 NOTE — PLAN OF CARE
Pt. Is alert and oriented to person, place and follows commands. She is very lethargic. It was noted that there was no urine output during the night: APN notified. Pt. assisted to bedside commode to encourage urine output. She had a loose BM but no urine.

## 2017-07-19 NOTE — PLAN OF CARE
Patient with gurgling, increased respiratory rate, and desaturations to low 80s immediately after taking Tylenol PO this evening. Appeared to have aspirated. NRB mask placed on patient for 30 min to allow patient to recover.   Able to wean patient back to

## 2017-07-19 NOTE — CONSULTS
[facility] Pharmacy Consult Note:  Medication List Evaluation for Delirium, Tremor, Elevated Ammonia    Luz Marina [place] is a 69year old female.   Pharmacy has been consulted tby Dr. Julián Renee Sr. to evaluate her current medications for those that could be contribu

## 2017-07-20 NOTE — PROGRESS NOTES
[address] [facility] Neurology Progress Note    Luz Marina [place] Patient Status:  Inpatient    1950 MRN [id number]   [facility] 2NE-A Attending Tavo Ralph MD   [facility] Day # 14 PCP Nilo Roche MD         Subjective:  Luz Marina [place] is a 6 intact bilaterally  Romberg:deferred    Labs:    Lab Results  Component Value Date   WBC 15.7 07/20/2017   HGB 8.5 07/20/2017   HCT 27.7 07/20/2017   .0 07/20/2017   CREATSERUM 2.12 07/20/2017   CREATSERUM 2.12 07/20/2017   CREATSERUM 2.10 07/20/201 following commands but lethargic and markedly elevated ammonia level explains this - consider after resolution of metabolic issues and monitor for any convulsive activity or focal neurologic symptoms       We will continue to follow along with you    Yany Duque

## 2017-07-20 NOTE — PROGRESS NOTES
Administered lactulose enema. Patient had a large, loose bowel movement with some red streaks. Notified GI. Heparin has been stopped per cardiology.

## 2017-07-20 NOTE — PHYSICAL THERAPY NOTE
PT evaluation orders received for PROM exercises. Pt is currently off floor for video swallow study. Will re-attempt later as able. Re-attempted this afternoon and RN informed that patient is receiving lactulose enema due to elevated ammonia levels.  RN

## 2017-07-20 NOTE — PROGRESS NOTES
[facility]  Nephrology Progress Note    Tim Blackwell Attending:  Sheryle Bjork, MD       Assessment and Plan:    1) Anasarca- I believe this is primarily due to cirrhosis; she has drank at least 5 glasses of wine a day for decades even while she was non-tender. + bowel sounds, no palpable organomegaly  Extremities: Without clubbing, cyanosis; diffuse edema  Neurologic: Cranial nerves grossly intact, moving all extremities  Skin: Warm and dry, no rashes       Labs:     Lab Results  Component Value Date (TYLENOL) tab 650 mg 650 mg Oral Q6H PRN   ondansetron HCl (ZOFRAN) injection 4 mg 4 mg Intravenous Q6H PRN         Questions/concerns were discussed with patient and/or family by bedside.           Parmjit Spears  7/20/2017  10:16 AM

## 2017-07-20 NOTE — CONSULTS
Gastroenterology Progress Note  Toni Moses Patient Status:  Inpatient    1950 MRN [id number]   [facility] 2NE-A Attending Naheed Muhammad MD   [facility] Day # 15 PCP Darin Villela MD     Chief 29.0  29.0  29.0  30.0  27.0  27.0  27.0   BUN  34*  35*  34*  33*  34*  34*  35*   CREATSERUM  1.83*  1.93*  1.89*  2.00*  2.12*  2.12*  2.10*       Recent Labs   Lab  07/19/17   0600  07/20/17   0641   ALT  16  13*   AST  61*  53*     Imaging:  PROCEDURE suggest a cirrhotic liver appearance, however her symptoms maybe related to hepatic congestion as a result of volume overload, infection, fluid shifts given ongoing diuresis, electrolyte abnormalities. No overt bleeding noted.  Will image abd to r/o obstruc

## 2017-07-20 NOTE — PLAN OF CARE
Heparin stopped due to hematuria. Discussed with Dr. Desire Uiras.     ANAND Duarte  7/20/2017  3:49 PM  [address]

## 2017-07-20 NOTE — PROGRESS NOTES
[facility]  Report of Psychiatric Progress Note    Shavonne Reed Patient Status:  Inpatient    1950 MRN [id number]   [facility] 2NE-A Attending Mary Judd MD   [facility] Day # 15 PCP Jacob Foster MD     Date of Admission:  15 with low energy, mood, appetite, and motivation. She feels that her quality of life has decreased due to her HFeEF on top of her COPD. She is NOT suicidal at this time, but has had passive suicidal ideation the past month. The thoughts are very transient. Past Surgical History:  No date: CATARACT      Comment: bilateral with lenses  2003: COLONOSCOPY,DIAGNOSTIC      Comment: Dr. Mickael Schwab, hyperplastic polyp  2002: HEMORRHOIDECTOMY      Comment: banding  No date: OTHER      Comment: bunion   No date: OTHER GM/15ML solution 20 g, 20 g, Oral, TID  •  heparin (PORCINE) drip 55973ywmor/250mL infusion CONTINUOUS, 200-3,000 Units/hr, Intravenous, Continuous  •  DilTIAZem HCl (CARDIZEM) tab 30 mg, 30 mg, Oral, Q8H MARIO  •  digoxin (LANOXIN) tab 125 mcg, 125 mcg, Ora 102.8 07/20/2017   MG 2.4 07/20/2017   PHOS 4.0 07/20/2017

## 2017-07-20 NOTE — SLP NOTE
ADULT VIDEOFLUOROSCOPIC SWALLOWING STUDY    Admission Date: 7/6/2017  Evaluation Date: 07/20/17  Radiologist: Dr. Dionicio Moya    Dear Dr. Romero Lab,  This letter is to inform you of [facility] results and/or plan of treatme conjunction with Radiologist.  Patient Positioned: Upright;Leaning to right. Patient Viewed: Lateral.  Patient Alertness: Lethargic; Constant cues requied to stay on task. Consistencies Presented:  Thin liquids (x2 teaspoons only; exam d/c'd d/t pt letharg satisfaction. Question pt's level of understanding given cognitive deficits    INTERDISCIPLINARY COMMUNICATION  Reviewed results with Radiologist; agreement verbalized.   Informed Rn of results via phone    Patient Experiencing Pain: No                FOLL

## 2017-07-20 NOTE — PROGRESS NOTES
[facility]  [facility] Progress Note    Silke Conde Patient Status:  Inpatient    1950 MRN [id number]   [facility] 2NE-A Attending David Chairez MD   [facility] Day # 15 PCP Eileen Cardoza MD     Subjective:   She is confus digoxin  125 mcg Oral Daily   • Levothyroxine Sodium  100 mcg Oral Before breakfast   • Fluticasone Furoate-Vilanterol  1 puff Inhalation Daily         Diagnostics:     Echo 7/7/17:  Conclusions:  1. Left ventricle:  The cavity size was normal. Wall thickne GI bleed  7. Hypothyroid on replacement.  TSH up, Synthroid adjusted  8. Obesity - BMI 38  9. Depression - now on zoloft per Dr. Emmy Gant  10. Urinary retention - Urology following with need for insertion of pascual catheter, per urology  12.  Tremors - Neurology

## 2017-07-20 NOTE — CONSULTS
[facility]  Report of Consultation    John Ellis Patient Status:  Inpatient    1950 MRN [id number]   [facility] 2NE-A Attending Padmini Reza MD   [facility] Day # 15 PCP Hunter Lee MD       Assessment / Plan:    1) FLAKITO- due to brody Dr. Anna Mcgee at [facility]  3/4/14: OTHER SURGICAL HISTORY      Comment: anal fistulotomy and excision of anal tag                byDr. Anna Mcgee at [facility]  2005: PAP IG, CT      Comment: normal  Family History   Problem Relation Age of Onset   • Breast 116/48 (BP Location: Left arm)   Pulse 70   Temp (!) 97.4 °F (36.3 °C) (Axillary)   Resp 18   Ht 61.81\"   Wt 207 lb 10.8 oz   SpO2 96%   BMI 38.22 kg/m²   Temp (24hrs), Av.7 °F (36.5 °C), Min:97.4 °F (36.3 °C), Max:97.9 °F (36.6 °C)       Intake/Outpu

## 2017-07-20 NOTE — PROGRESS NOTES
[facility]  Progress Note    Harjinder Coronado Patient Status:  Inpatient    1950 MRN [id number]   [facility] 2NE-A Attending Jordan Braga MD   [facility] Day # 15 PCP Maximiliano Nelson MD     Subjective:  Harjinder Coronado is a(n) 69year old fema --   17.1*   INR   --    --   1.38*   PTT  84.8*  100.6*  102.8*     Recent Labs   Lab  07/20/17   1420   ABGPHT  7.39   TRIPUP6H  45   CQVZM8V  110*   ABGHCO3  26.7*   ABGBE  1.4   TEMP  95.7   AUTUMN  Positive   SITE  Right Radial   DEV  Nasal cannula precludes p.o. Intake-pending GI assessment for ileus /possible cirrhosis -may begin tube feedings.   11.  History EtOH abuse chronically    Lactulose enemas and via NG  to begin  Continue n.p.o. Status  Ongoing diuresis as per renal service  Begun on Rocep

## 2017-07-21 NOTE — PLAN OF CARE
0410  Pt received last evening moaning on and off but mostly responsive just to pain but did not open eyes. Abd firm, distended and anasarca noted. On 5L she has sats low 90's HR regular in 70's.   She does not answer any questions nor follow any commands

## 2017-07-21 NOTE — PHYSICAL THERAPY NOTE
PT orders recd 7/19/17. Pt now transferred to ICU due to decline in medical status. Due to change, will dc from our services.   New orders required when pt approp for PT.

## 2017-07-21 NOTE — PROGRESS NOTES
[facility]  Nephrology Progress Note    Yana Trejo Attending:  Melania Henderson MD       Assessment and Plan:    1) FLAKITO- due to hypotension and probable sepsis in the setting of cirrhosis physiology now with oliguric renal failure and hyperkalemia. clubbing, cyanosis; diffuse lizet  Neurologic: Cranial nerves grossly intact, moving all extremities  Skin: Warm and dry, no rashes       Labs:     Lab Results  Component Value Date   WBC 33.7 07/21/2017   HGB 8.4 07/21/2017   HCT 27.8 07/21/2017   . HCl (ZOFRAN) injection 4 mg 4 mg Intravenous Q6H PRN         Questions/concerns were discussed with patient and/or family by bedside.           Parmjit Spears  7/21/2017  2:30 PM

## 2017-07-21 NOTE — PLAN OF CARE
CARDIOVASCULAR - ADULT    • Maintains optimal cardiac output and hemodynamic stability Not Progressing        RESPIRATORY - ADULT    • Achieves optimal ventilation and oxygenation Not Progressing        Pt transfer from floor this AM. Intubated upon arriva

## 2017-07-21 NOTE — PROGRESS NOTES
[facility]  Progress Note    Brii Preciado Patient Status:  Inpatient    1950 MRN [id number]   [facility] 4SW-A Attending Frances Johnson MD   [facility] Day # 15 PCP Maki Hidalgo MD     Subjective: events reviewed, pt became unresponsive Lab  07/19/17   1634  07/20/17   0641  07/21/17   0445  07/21/17   0615  07/21/17   0622   NA  133*  133*  133*  133*  129*  129*  130*   K  4.5  4.7  4.7  4.7  5.8*  5.9*  5.9*   CL  97*  97*  97*  97*  93*  93*  94*   CO2  30.0  27.0  27.0  27.0  31.0 (CPT=71010)  TECHNIQUE:  AP chest radiograph was obtained. COMPARISON:  [facility] , XR CHEST AP PORTABLE  (CPT=71010), 7/21/2017, 7:22. [facility] , XR CHEST AP PORTABLE  (CPT=71010), 7/21/2017, 7:22.   [facility] , XR CHEST AP PORTABLE  (CPT=71010), 7/19/2017, 5:54 CHEST AP PORTABLE  (CPT=71010), 7/21/2017, 6:16. INDICATIONS:  s/p Intubation  PATIENT STATED HISTORY: (As transcribed by Technologist)     FINDINGS:  Endotracheal tube tip 1.5 cm from the pavel. NG tube below diaphragm. Right central line tip in SVC.  St 4 mg 4 mg Intravenous Q6H PRN       Allergies:    Augmentin [Amoxicil*        Comment:Diarrhea  Penicillins                 Comment:The patient does not use penicillin        Impression:  1. Unresponsive with high ammonia and CO2 retention.  Encephalopathy Heart Failure  [facility]    7/21/2017  3:31 PM

## 2017-07-21 NOTE — PROCEDURES
[address] [facility]  7/21/2017    2:38 PM      Name: Silke Conde  6/5/1950  Date of Study: 7/21/2017    ELECTROENCEPHALOGRAPHY     Ordering Physician: Janis Parkinson MD

## 2017-07-21 NOTE — SLP NOTE
Pt transferred to ICU and intubated upon arrival.  Not appropriate for re-evaluation or therapy at this time. Will continue to follow and re-assess when available and appropriate.     Veronica Mitchell MA, Jacy Dee  Speech Language Pathologist

## 2017-07-21 NOTE — PROGRESS NOTES
[facility]  Progress Note    Tamika Thomas Patient Status:  Inpatient    1950 MRN [id number]   [facility] 4SW-A Attending Claudette Kirk MD   [facility] Day # 13 PCP Musa Reid MD     STATUS UPDATE: Chart reviewed.   Over the last 48 ho Nares normal.   Throat: Lips, mucosa, and tongue normal.  No thrush noted. Neck: Soft, supple neck; trachea midline, no adenopathy, no thyromegaly. Lungs: Symmetrical air entry without wheezes or crackles are noted.   Peak pressure 20, plateau pressure Progressive acute renal insufficiency  4.   Encephalopathy not related to hypercapnia-suspected related to elevated ammonia levels r/o cirrohosis although not obvious on ultrasound earlier this month  4.  Anemia new- OB + stool in ER-gradual drift status p

## 2017-07-21 NOTE — CONSULTS
[facility] dosing service    Initial Pharmacokinetic Consult for Vancomycin Dosing     Chris Rivas is a 69year old female who is being treated for sepsis.   Pharmacy has been asked to dose Vancomycin by Dr. Evie Stearns Sr    She is allergic to augmentin Vancomycin to assess renal function. 4.  Pharmacy will follow and monitor renal function changes, toxicity and efficacy. Pharmacy will continue to follow her. We appreciate the opportunity to assist in her care.     Quynh Cotton, PharmD  7/21/2017

## 2017-07-21 NOTE — PROGRESS NOTES
[facility] Pharmacy Note:  Renal Adjustment for Zosyn (piperacillin/tazobactam)    Kya Cornelius is a 69year old female who has been prescribed Zosyn (piperacillin/tazobactam) 3.375 g every 8 hrs.   CrCl is estimated creatinine clearance is 14.1 mL/min (based on

## 2017-07-21 NOTE — DIETARY NOTE
[address] ASSESSMENT    Pt is at moderate nutrition risk. Pt does not meet malnutrition criteria.     NUTRITION DIAGNOSIS/PROBLEM:    Inadequate oral intake related to inability to consume adequate PO as evidenced by respiratory s edema    NUTRITION PRESCRIPTION: based on 50 kg  Calories: 7494-1628 calories/day (30-35 calories per kg)  Protein: 100-125 grams protein/day (2.0-2.5 grams protein per kg)  Fluid: ~1 ml/kcal or per MD discretion    MONITOR AND EVALUATE/NUTRITION GOALS:

## 2017-07-21 NOTE — OCCUPATIONAL THERAPY NOTE
Patient transferred from CTU 2 to 473 7/21/17 @ 702 d/t increased ammonia levels with noted unresponsive state to painful stimuli. Blood gases demonstrating acute on chronic respiratory insufficiency with an acidotic pH and CO2 level in the 80s.   Patient

## 2017-07-21 NOTE — PROGRESS NOTES
[facility] hospitalist daily note  Patient was seen/examined on 7/20/17     S;  at the bedside during my visit.  Could not complete video swallow   patinet developed hematuria, Heparin stopped      Medications in EPIC

## 2017-07-21 NOTE — PROGRESS NOTES
[facility]    Patients Name: Deepthi Teran  Attending Physician: Luz Marina Browne MD  CSN: [id number]   Location:  473/473-A  MRN: [id number]    YOB: 1950  Admission Date: 7/6/2017     Intubation    Called to Intubate Patient.     Indicatio

## 2017-07-21 NOTE — PROGRESS NOTES
UROLOGY PROGRESS NOTE  Chart reviewed. No urologic intervention indicated at this time. Arias may be discontinued when felt appropriate by primary services. We will sign off at this time. Please let us know if we can be of any further assistance.     Xuan

## 2017-07-21 NOTE — PROGRESS NOTES
[facility] [address]  Neurology  Notes  Affiliated with [facility]  2017, 10:11 AM     Neri Kaminski Patient Status:  Inpatient    1950 MRN [id number]   [facility] 4SW-A Attending Josesito Pritchett MD   [facility] movement  Arms and legs no reaction to pain      During today's visit, the following items were reviewed: recent medical and surgical history.       A:  Severe encephalopathy which is multifactorial    Most concern is her unresponsiveness    Structural issu

## 2017-07-21 NOTE — PROGRESS NOTES
CT scan of the abdomen does demonstrate the presence of cirrhosis and ascites. Due to the accompanying abdominal distention/ileus, it was not possible to perform a paracentesis today. Results were discussed with her .   Based on her previously expr

## 2017-07-22 NOTE — PROGRESS NOTES
[facility] Pharmacy Note: Antimicrobial Weight Dose Adjustment for: Zosyn (piperacillin/tazobactam)    Sha Mccain is a 69year old female who has been prescribed Zosyn (piperacillin/tazobactam) 3.375gm.   CrCl is estimated creatinine clearance is 14.2 mL/min (b

## 2017-07-22 NOTE — PROGRESS NOTES
07/22/17 3354   Clinical Encounter Type   Visited With Patient and family together   Continue Visiting Yes   Crisis Visit Patient actively dying  ( considering withdrawing life support tomorrow.)   Jewish Encounters   Jewish Needs Prayer

## 2017-07-22 NOTE — CONSULTS
INFECTIOUS DISEASE CONSULT NOTE    Margo Kurtz Patient Status:  Inpatient    1950 MRN [id number]   [facility] 4SW-A Attending Camilo Smith MD   [facility] Day # 12 PCP [place], Roby Simmonds Pulmonary nodule 2008    stable   • Unspecified disorder of thyroid      Past Surgical History:  No date: CATARACT      Comment: bilateral with lenses  2003: COLONOSCOPY,DIAGNOSTIC      Comment: Dr. Mariposa Aguilera, hyperplastic polyp  2002: HEMORRHOIDECTOMY      C Continuous  •  ipratropium-albuterol (DUONEB) nebulizer solution 3 mL, 3 mL, Nebulization, 6 times per day  •  0.9%  NaCl infusion, , Intravenous, Continuous **FOLLOWED BY** [DISCONTINUED] 0.9%  NaCl infusion, , Intravenous, Continuous  •  [START ON 7/23/2 07/21/17   0445  07/21/17   0622  07/22/17   0447   RBC  3.01*  2.91*  2.90*  3.15*   HGB  8.5*  8.4*  8.4*  9.1*   HCT  27.7*  27.7*  27.8*  30.1*   MCV  92.0  95.2  95.9  95.6   MCH  28.2  28.9  29.0  28.9   MCHC  30.7*  30.3*  30.2*  30.2*   RDW  18.8* Endogastric tube with tip in the expected location of the stomach. Significant gaseous distention of small bowel and gas noted within the colon. Moderate to large amount of treatment are noted within the rectum.  Degenerative disc disease of the lumbar spin Technologist)  She complains of bilateral leg swelling. FINDINGS:  Cardiac size is within normal limits. There is scarring and bullous emphysema in the right upper lobe. There is pulmonary hypoinflation and mild atelectasis at both lung bases.  There is 7/17/2017  PROCEDURE:  CT BRAIN OR HEAD (07161)  COMPARISON:  None. INDICATIONS:  waxing/waning LOC, new tremor  TECHNIQUE:  Noncontrast CT scanning is performed through the brain. Dose reduction techniques were used.  Dose information is transmitted to th body habitus. LIVER:  Diffuse increased echogenicity throughout the liver consistent with fatty infiltration BILIARY:  Cholelithiasis. Gallbladder wall upper limits of normal in thickness measuring 3 mm. No intrahepatic or common bile duct dilatation.  Comm imaging were performed. The following veins were imaged:  Subclavian, Jugular, Axillary, Brachial, Basilic, Cephalic, and the contralateral Subclavian and Jugular. FINDINGS:  EXTREMITY:  Left arm THROMBI:  None visible.  COMPRESSION:  Normal compressibili aspiration  PATIENT STATED HISTORY: (As transcribed by Technologist)  Coughing with thin liquids. CINE CAPTURES:  3 FLUORSCOPY TIME:  56seconds RADIATION DOSE (AIR KERMA PRODUCT):  25.3   FINDINGS:    Study performed with speech pathology.  Patient is fed or focal lesion. KIDNEYS:  Unremarkable noncontrast CT appearance. No mass, obstruction, or calcification. ADRENALS:  Normal.  No mass or enlargement. AORTA/VASCULAR:  Atherosclerotic calcifications. No aneurysm.  RETROPERITONEUM:  Normal.  No mass or a trachea in good position. There is a right IJ catheter with the tip at the SVC right atrial junction. Nasogastric tube is below the level of the film and well within the stomach. There is stable cardiomegaly.  Stable airspace opacities in the right upper a Radha Hua MD            Xr Chest Ap Portable  (cpt=71010)    Result Date: 7/21/2017  PROCEDURE:  XR CHEST AP PORTABLE (CPT=71010)  TECHNIQUE:  AP chest radiograph was obtained.   COMPARISON:  [facility] XR CHEST AP PORTABLE  (CPT=71010), 7/21/2017, 6: placement  PATIENT STATED HISTORY: (As transcribed by Technologist)  NG tube placement. FINDINGS:  There is a new feeding tube. Tip mid stomach. Lung volumes are small. Parenchymal opacities in the right appear unchanged.  No new consolidation or pleural Xr Chest Ap Portable  (cpt=71010)    Result Date: 7/17/2017  PROCEDURE:  XR CHEST AP PORTABLE (CPT=71010)  TECHNIQUE:  AP chest radiograph was obtained. COMPARISON:  [facility] XR CHEST AP PORTABLE  (CPT=71010), 7/16/2017, 12:16.   INDICATIONS:  dyspnea PROCEDURE:  The procedure was discussed in detail with the patient. Review of benefits, alternatives, and risks. Discussion of risks included, but was not limited to infection, bleeding, organ/nerve injury. All questions are answered.  She voiced understand hypercapnic resp failure due to advanced COPD in the face of hepatic encephalopathy- intubated    3. Cirrhosis with hepatic encephalopathy and asctitis    4. Ileus    5.  Urinary retention, s/p pascual- UA abnl, with pyuria but this could be due to pascual, new

## 2017-07-22 NOTE — PLAN OF CARE
CARDIOVASCULAR - ADULT    • Maintains optimal cardiac output and hemodynamic stability Not Progressing        Patient/Family Goals    • Patient/Family Long Term Goal Not Progressing    • Patient/Family Short Term Goal Not Progressing        RESPIRATORY - A

## 2017-07-22 NOTE — PROGRESS NOTES
[facility]  Nephrology Progress Note    Stormy Baum Patient Status:  Inpatient    1950 MRN [id number]   [facility] 4SW-A Attending Sandy Bajwa MD   [facility] Day # 12 PCP Gideon Suarez MD       SUBJECTIVE:  No acute events, remains --   95.6   PLT  166.0  160.0  166.0   --   201.0   BAND  15  16  12   --   14   INR  1.38*   --    --   1.42*   --        Recent Labs   Lab  07/20/17   0641  07/21/17   0445  07/21/17   0615  07/21/17   0622  07/21/17   1543  07/22/17   0447   NA  133* (Adjusted) Intravenous Q48H   bisacodyl (DULCOLAX) rectal suppository 10 mg 10 mg Rectal Daily PRN   lactulose (ENULOSE) 200 g in Sterile Water for Irrigation 1,000 mL retention enema 200 g Rectal Q8H   rifaximin (XIFAXAN) tab 200 mg 200 mg Oral BID   Ranulfo

## 2017-07-22 NOTE — PROGRESS NOTES
[address] Milly Swart[facility] Neurology Progress Note    Marc Hernandez Patient Status:  Inpatient    1950 MRN [id number]   [facility] 4SW-A Attending Carie Boswell MD   [facility] Day # 16 PCP Luc Farrell MD     Subjective:  Marc Hernandez is a(n) 69 and Versed prior to EEG  2. Acute Respiratory Failure - on vent  3. COPD  4. FLAKITO  5. Elevated ammonia levels suspicious for hepatic failure - 170 today  6. Leukocytosis - WBC 33.5 today   7. UTI - on abx  8. Anasarca  9. Hx ETOH Abuse  10.  MODS      Plan:

## 2017-07-22 NOTE — PROGRESS NOTES
Gastroenterology Progress Note  Patient Name: Bryan Childress  Chief Complaint: anasarca  S: Intubated for resp failure, multiorgan failure, no improvement and further deterioration, family considering comfort care  O: BP 93/31   Pulse 84   Temp (!) 96.6 °F

## 2017-07-22 NOTE — PROGRESS NOTES
[facility]  Progress Note    Ina Belcher Patient Status:  Inpatient    1950 MRN [id number]   [facility] 4SW-A Attending Saint Hausen, MD   [facility] Day # 12 PCP Salina Miller MD     Subjective:  Ina Belcher is a(n) 69year old fema 102*  120*  120*   BUN  34*  34*  37*   CREATSERUM  2.95*  2.91*  2.94*   CA  8.9  8.3  8.2*   NA  130*  131*  132*   K  5.9*  5.0  5.0  4.6   CL  94*  97*  98*   CO2  30.0  27.0  24.0     Recent Labs   Lab  07/21/17   1310   ABGPHT  7.27*   [id number]  59* status DNR. Continue full vent support at the present time. · Follow up cultures. abx empirically broadened to zosyn and vanco  · Follow up GI recommendations, continue lactulose  · Code status DNR. VM left for  to update him on pt's status.

## 2017-07-22 NOTE — PROGRESS NOTES
[facility] hospitalist daily note  Patient was seen/examined on 7/22/17    S: patient intubated,  at the bedside    Medications in EPIC    PE;   07/22/17  1830   BP:    Pulse: 84   Resp: 28   Temp:      Gen: intubated  HEENT: anicteric  CV: nl S1/S2  P alkalosis; resolved     Tachycardia, suspected MAR; cardiology following, patient was on CCB     Hypothyroidism; levothyroxine was ordered during admit     Acute blood loss anemia, GI bleed, seen by GI.  PPI ordered        Depression; followed by psychiatry

## 2017-07-22 NOTE — PLAN OF CARE
CARDIOVASCULAR - ADULT    • Maintains optimal cardiac output and hemodynamic stability Not Progressing        NEUROLOGICAL - ADULT    • Achieves stable or improved neurological status Not Progressing        RESPIRATORY - ADULT    • Achieves optimal ventila

## 2017-07-23 NOTE — PROGRESS NOTES
[facility]  Progress Note    Shavonne Reed Patient Status:  Inpatient    1950 MRN [id number]   [facility] 4SW-A Attending Jaun Oden MD   [facility] Day # 16 PCP Jacob Foster MD     Subjective:  Shavonne Reed is a(n) 69year old fema CREATSERUM  2.91*  2.94*  3.43*   CA  8.3  8.2*  7.4*   NA  131*  132*  135*   K  5.0  5.0  4.6  4.6   CL  97*  98*  102   CO2  27.0  24.0  22.0     Recent Labs   Lab  07/22/17   0848   ABGPHT  7.26*   [id number]  53*   XWUGW0Q  85   ABGHCO3  23.2   ABGBE overall  · Follow up cultures. abx empirically broadened to zosyn and vanco  · No further GI recommendations, continue lactulose  · Code status DNR.  at bedside today. Reviewed and updated him.   He is aware she is declining and without likely baljeet

## 2017-07-23 NOTE — PROGRESS NOTES
[address] [facility]  TEL: [phone]  FAX: [phone]    Neri Kaminski Patient Status:  Inpatient    1950 MRN [id number]   [facility] 4SW-A Attending Josesito Pritchett MD   [facility] Day # 16 PCP Francis Krishna MD ALB  2.4*   --   2.2*  1.9*   NA  130*  131*  132*  135*   K  5.9*  5.0  5.0  4.6  4.6   CL  94*  97*  98*  102   CO2  30.0  27.0  24.0  22.0   ALKPHO  84   --   85  115   AST  50*   --   44*  47*   ALT  13*   --   12*  15   BILT  0.6   --   0.7  1.1   T Resistant Staphylococcus aureus (MRSA)only and does not detect Methicillin Sensitive Staphylococcus aureus(MSSA). Body Fluid Cult Aerobic and Anaerobic Once [[id number]]    Order Status: Sent Lab Status: No result    Specimen:  Body fluid, unspecified from or congestion. Dictated by: Laquita Vickers on 7/23/2017 at 10:36   Approved by: Laquita Vickers      ASSESSMENT:     1.  Marked leukocytosis                           -dif diagnosis: line infecion vs bacterial translocation from bowel in the settin

## 2017-07-23 NOTE — PROGRESS NOTES
Patient's family decided to withdraw care at this time. Discussed with Dr. Wiley Acharya. Comfort order set placed.        ANAND Ruby  ICU  7/23/2017, 4:33 PM

## 2017-07-23 NOTE — PROGRESS NOTES
[facility]  Nephrology Progress Note    Juice Breen Patient Status:  Inpatient    1950 MRN [id number]   [facility] 4SW-A Attending Maryann Arreola MD   [facility] Day # 16 PCP Gerry Panda MD       SUBJECTIVE:  No sig changes.   remains 9. 1*  8.9*   MCV  95.2  95.9   --   95.6  95.5   PLT  160.0  166.0   --   201.0  194.0   BAND  16  12   --   14  24   INR   --    --   1.42*   --    --        Recent Labs   Lab  07/21/17   0445  07/21/17   0615  07/21/17   0622  07/21/17   1543  07/22/17 Daily PRN   rifaximin (XIFAXAN) tab 200 mg 200 mg Oral BID   Miconazole Nitrate 2 % powder  [email or web address] [email or web address]   heparin (PORCINE) drip 04211ltcgn/250mL infusion CONTINUOUS 200-3,000 Units/hr Intravenous Continuous   acetaminophen (TYLENOL) tab 650 mg 6

## 2017-07-24 VITALS
BODY MASS INDEX: 42.4 KG/M2 | WEIGHT: 230.38 LBS | OXYGEN SATURATION: 75 % | HEIGHT: 61.81 IN | DIASTOLIC BLOOD PRESSURE: 24 MMHG | TEMPERATURE: 98 F | SYSTOLIC BLOOD PRESSURE: 47 MMHG

## 2017-07-24 NOTE — PROGRESS NOTES
At 1930 note patient is comfortable, on morphine drip 2mg/hr. Weaned to room air and note no change in respiratory effort or discomfort.   Reviewed plan of care and offered emotional support to spouse and sister in law (spouse Edilberto's sister) who are both e

## 2017-07-24 NOTE — DISCHARGE SUMMARY
[facility]  Discharge Summary    Yana Trejo Patient Status:  Inpatient    1950 MRN [id number]   [facility] 4SW-A Attending Melania Henderson MD   [facility] Day # 16 PCP Becky Jensen MD     Date of Admission: 2017    Date of [address] admission  CT head without acute changes  Neurology was on the case     Leukocytosis, concern for sepsis with shock:   blood culture ordered. Patient was  being treated for UTI.     ID saw patient during admit, . susan was on Zosyn and Vanco  Unable to d

## 2021-03-19 NOTE — PROGRESS NOTES
[facility] hospitalist daily note    Patient was seen/examined on 7/21/17  S; I saw this patient several times on 7/21/17.  at the bedside both times and updated. Patient had become more obtunded with shallow respirations, ammonia level elevated.  Was e cardiology, renal  -patient was receiving ultrafiltration during admit per cardiology  -concern for cirrhosis raised, GI on the case    Altered mental status, concern for hepatic encephalopathy; lactulose ordered.  GI on the case  CT head without acute brar normal...
